# Patient Record
Sex: MALE | Race: WHITE | NOT HISPANIC OR LATINO
[De-identification: names, ages, dates, MRNs, and addresses within clinical notes are randomized per-mention and may not be internally consistent; named-entity substitution may affect disease eponyms.]

---

## 2017-01-05 ENCOUNTER — PRIOR ORIGINAL RECORDS (OUTPATIENT)
Dept: OTHER | Age: 67
End: 2017-01-05

## 2017-01-05 ENCOUNTER — APPOINTMENT (OUTPATIENT)
Dept: LAB | Age: 67
End: 2017-01-05
Attending: INTERNAL MEDICINE
Payer: COMMERCIAL

## 2017-01-05 DIAGNOSIS — N40.0 BPH WITH ELEVATED PSA: ICD-10-CM

## 2017-01-05 DIAGNOSIS — R97.20 BPH WITH ELEVATED PSA: ICD-10-CM

## 2017-01-05 LAB — COMPLEXED PSA SERPL-MCNC: 1.58 NG/ML (ref 0.01–4)

## 2017-01-05 PROCEDURE — 36415 COLL VENOUS BLD VENIPUNCTURE: CPT

## 2017-03-07 ENCOUNTER — HOSPITAL ENCOUNTER (OUTPATIENT)
Dept: CV DIAGNOSTICS | Age: 67
Discharge: HOME | End: 2017-03-07
Attending: INTERNAL MEDICINE
Payer: COMMERCIAL

## 2017-03-07 ENCOUNTER — PRIOR ORIGINAL RECORDS (OUTPATIENT)
Dept: OTHER | Age: 67
End: 2017-03-07

## 2017-03-07 DIAGNOSIS — I65.23 BILATERAL CAROTID ARTERY STENOSIS: ICD-10-CM

## 2017-03-08 ENCOUNTER — TELEPHONE (OUTPATIENT)
Dept: FAMILY MEDICINE CLINIC | Facility: CLINIC | Age: 67
End: 2017-03-08

## 2017-03-10 ENCOUNTER — PRIOR ORIGINAL RECORDS (OUTPATIENT)
Dept: OTHER | Age: 67
End: 2017-03-10

## 2017-10-02 ENCOUNTER — TELEPHONE (OUTPATIENT)
Dept: FAMILY MEDICINE CLINIC | Facility: CLINIC | Age: 67
End: 2017-10-02

## 2017-10-02 DIAGNOSIS — I25.10 CAD S/P PERCUTANEOUS CORONARY ANGIOPLASTY: ICD-10-CM

## 2017-10-02 DIAGNOSIS — Z98.61 CAD S/P PERCUTANEOUS CORONARY ANGIOPLASTY: ICD-10-CM

## 2017-10-02 DIAGNOSIS — E78.5 DYSLIPIDEMIA: ICD-10-CM

## 2017-10-09 ENCOUNTER — PRIOR ORIGINAL RECORDS (OUTPATIENT)
Dept: OTHER | Age: 67
End: 2017-10-09

## 2017-10-09 ENCOUNTER — LAB ENCOUNTER (OUTPATIENT)
Dept: LAB | Age: 67
End: 2017-10-09
Attending: INTERNAL MEDICINE
Payer: MEDICARE

## 2017-10-09 DIAGNOSIS — Z98.61 CAD S/P PERCUTANEOUS CORONARY ANGIOPLASTY: ICD-10-CM

## 2017-10-09 DIAGNOSIS — E78.5 DYSLIPIDEMIA: ICD-10-CM

## 2017-10-09 DIAGNOSIS — I25.10 CAD S/P PERCUTANEOUS CORONARY ANGIOPLASTY: ICD-10-CM

## 2017-10-09 PROCEDURE — 83036 HEMOGLOBIN GLYCOSYLATED A1C: CPT

## 2017-10-09 PROCEDURE — 80061 LIPID PANEL: CPT

## 2017-10-09 PROCEDURE — 80053 COMPREHEN METABOLIC PANEL: CPT

## 2017-10-09 PROCEDURE — 36415 COLL VENOUS BLD VENIPUNCTURE: CPT

## 2017-10-09 PROCEDURE — 85025 COMPLETE CBC W/AUTO DIFF WBC: CPT

## 2017-10-11 ENCOUNTER — PRIOR ORIGINAL RECORDS (OUTPATIENT)
Dept: OTHER | Age: 67
End: 2017-10-11

## 2017-10-13 ENCOUNTER — OFFICE VISIT (OUTPATIENT)
Dept: FAMILY MEDICINE CLINIC | Facility: CLINIC | Age: 67
End: 2017-10-13

## 2017-10-13 VITALS
WEIGHT: 148 LBS | HEIGHT: 67 IN | SYSTOLIC BLOOD PRESSURE: 130 MMHG | BODY MASS INDEX: 23.23 KG/M2 | DIASTOLIC BLOOD PRESSURE: 72 MMHG | RESPIRATION RATE: 16 BRPM | HEART RATE: 68 BPM

## 2017-10-13 DIAGNOSIS — Z23 NEED FOR VACCINATION: ICD-10-CM

## 2017-10-13 DIAGNOSIS — Z00.00 WELCOME TO MEDICARE PREVENTIVE VISIT: Primary | ICD-10-CM

## 2017-10-13 PROBLEM — R00.8 SUPRAVENTRICULAR BIGEMINY: Status: ACTIVE | Noted: 2017-10-13

## 2017-10-13 PROCEDURE — 90686 IIV4 VACC NO PRSV 0.5 ML IM: CPT | Performed by: INTERNAL MEDICINE

## 2017-10-13 PROCEDURE — G0008 ADMIN INFLUENZA VIRUS VAC: HCPCS | Performed by: INTERNAL MEDICINE

## 2017-10-13 PROCEDURE — G0403 EKG FOR INITIAL PREVENT EXAM: HCPCS | Performed by: INTERNAL MEDICINE

## 2017-10-13 PROCEDURE — G0402 INITIAL PREVENTIVE EXAM: HCPCS | Performed by: INTERNAL MEDICINE

## 2017-10-13 NOTE — PATIENT INSTRUCTIONS
Thank you for choosing Juve Travis MD at [place] [address]  To Do: Feliberto Mckeon  1. Labs are great  2. Flu shot today  Effective 6/19/17 until November 2017  Due to [employer] is being moved.   It is inside the [facility] your duty and for your safety to discuss with the pharmacist and our office with questions, and to notify us and stop treatment if problems arise, but know that our intention is that the benefits outweigh those potential risks and we strive to make you hea

## 2017-10-13 NOTE — PROGRESS NOTES
Tomás Rascon is a 74year old male who presents for a Medicare Initial Preventative Physical exam.    Health Maintenance Topics with due status: Overdue       Topic Date Due    Fall Risk Screening 04/24/2017    Adult Pneumonia Vaccine 04/24/2017    Ny Moderate    How would you describe your current health state?: Good    How do you maintain positive mental well-being?: Social Interaction; Visiting Friends; Visiting Family    If you are a male age 38-65 or a female age 47-67, do you take aspirin?: Yes    H med list or else it's contraindicated based on patient's intolerance to aspirin.     Diet assessment: good     Advanced Directive:  Healthcare Power of  on file in [address] [place] [address] Rd:   See above discussed with patient and provided information      Living Will on Hepatitis B No orders found for this or any previous visit. Update Immunization Activity if applicable    Tetanus No orders found for this or any previous visit.  Update Immunization Activity if applicable    Zoster (Not covered by Medicare Part B) No order 5/23/2014   • Dysplastic nevi 2/21/2014   • Seborrheic keratoses 7/14/2015   • Smoker 6/20/2013   • Supraventricular bigeminy 10/13/2017      Past Surgical History:  No date: ANGIOPLASTY (CORONARY)  2011: COLONOSCOPY      Comment: nl   Family History   Pro Right Eye Chart Acuity: 20/40 Left Eye Chart Acuity: 20/40   Constitutional: He appears well-developed, nourished, and his stated age. Vital signs reviewed  Pleasant man grey haired  Head: Normocephalic and atraumatic.    Ear: TMs visible and normal bilat Patient's Self Reported Health Risks as noted above. Risk factors for Behavioral/Psychiatric/Psychosocial Risk to Health we discussed as noted above and below.   Diet counseling perfomed  Exercise counseling perfomed  5 year plan includes:  Fall Risk Scree

## 2017-10-23 LAB
ALBUMIN: 3.4 G/DL
ALKALINE PHOSPHATATE(ALK PHOS): 55 IU/L
BILIRUBIN TOTAL: 0.4 MG/DL
BUN: 18 MG/DL
CALCIUM: 8.7 MG/DL
CHLORIDE: 112 MEQ/L
CREATININE, SERUM: 0.85 MG/DL
GLUCOSE: 91 MG/DL
POTASSIUM, SERUM: 3.9 MEQ/L
PROTEIN, TOTAL: 6.7 G/DL
SGOT (AST): 21 IU/L
SGPT (ALT): 19 IU/L
SODIUM: 144 MEQ/L

## 2017-10-27 LAB
ALT (SGPT): 19 U/L
AST (SGOT): 21 U/L
CHOLESTEROL, TOTAL: 129 MG/DL
GLUCOSE: 91 MG/DL
HDL CHOLESTEROL: 59 MG/DL
HEMATOCRIT: 43.9 %
HEMOGLOBIN A1C: 5.8 %
HEMOGLOBIN: 14.6 G/DL
LDL CHOLESTEROL: 56 MG/DL
PLATELETS: 182 K/UL
RED BLOOD COUNT: 4.55 X 10-6/U
TRIGLYCERIDES: 70 MG/DL
WHITE BLOOD COUNT: 6.4 X 10-3/U

## 2018-01-11 ENCOUNTER — PRIOR ORIGINAL RECORDS (OUTPATIENT)
Dept: OTHER | Age: 68
End: 2018-01-11

## 2018-01-11 ENCOUNTER — MYAURORA ACCOUNT LINK (OUTPATIENT)
Dept: OTHER | Age: 68
End: 2018-01-11

## 2018-09-04 ENCOUNTER — HOSPITAL ENCOUNTER (OUTPATIENT)
Dept: CV DIAGNOSTICS | Age: 68
Discharge: HOME | End: 2018-09-04
Attending: INTERNAL MEDICINE
Payer: MEDICARE

## 2018-09-04 DIAGNOSIS — I25.10 CORONARY ARTERY DISEASE: ICD-10-CM

## 2018-09-04 PROCEDURE — 93018 CV STRESS TEST I&R ONLY: CPT | Performed by: INTERNAL MEDICINE

## 2018-09-04 PROCEDURE — 93350 STRESS TTE ONLY: CPT | Performed by: INTERNAL MEDICINE

## 2018-09-04 PROCEDURE — 93017 CV STRESS TEST TRACING ONLY: CPT | Performed by: INTERNAL MEDICINE

## 2018-09-05 NOTE — PROGRESS NOTES
Imaging report reviewed and shows no concerning findings.  Please notify patient.    -Dr. Shannan Ny

## 2018-09-10 ENCOUNTER — PRIOR ORIGINAL RECORDS (OUTPATIENT)
Dept: OTHER | Age: 68
End: 2018-09-10

## 2018-09-27 ENCOUNTER — PRIOR ORIGINAL RECORDS (OUTPATIENT)
Dept: OTHER | Age: 68
End: 2018-09-27

## 2018-09-27 ENCOUNTER — LAB ENCOUNTER (OUTPATIENT)
Dept: LAB | Age: 68
End: 2018-09-27
Attending: INTERNAL MEDICINE
Payer: MEDICARE

## 2018-09-27 DIAGNOSIS — E78.00 PURE HYPERCHOLESTEROLEMIA: Primary | ICD-10-CM

## 2018-09-27 DIAGNOSIS — I25.10 CORONARY ATHEROSCLEROSIS OF NATIVE CORONARY ARTERY: ICD-10-CM

## 2018-09-27 DIAGNOSIS — R00.2 PALPITATIONS: ICD-10-CM

## 2018-09-27 LAB
ALT SERPL-CCNC: 15 U/L (ref 17–63)
ANION GAP SERPL CALC-SCNC: 6 MMOL/L (ref 0–18)
AST SERPL-CCNC: 19 U/L (ref 15–41)
BUN BLD-MCNC: 15 MG/DL (ref 8–20)
BUN/CREAT SERPL: 16 (ref 10–20)
CALCIUM BLD-MCNC: 8.7 MG/DL (ref 8.3–10.3)
CHLORIDE SERPL-SCNC: 110 MMOL/L (ref 101–111)
CHOLEST SMN-MCNC: 132 MG/DL (ref ?–200)
CO2 SERPL-SCNC: 27 MMOL/L (ref 22–32)
CREAT BLD-MCNC: 0.94 MG/DL (ref 0.7–1.3)
GLUCOSE BLD-MCNC: 92 MG/DL (ref 70–99)
HDLC SERPL-MCNC: 61 MG/DL (ref 40–59)
LDLC SERPL CALC-MCNC: 60 MG/DL (ref ?–100)
NONHDLC SERPL-MCNC: 71 MG/DL (ref ?–130)
OSMOLALITY SERPL CALC.SUM OF ELEC: 296 MOSM/KG (ref 275–295)
POTASSIUM SERPL-SCNC: 4.1 MMOL/L (ref 3.6–5.1)
SODIUM SERPL-SCNC: 143 MMOL/L (ref 136–144)
TRIGL SERPL-MCNC: 56 MG/DL (ref 30–149)
VLDLC SERPL CALC-MCNC: 11 MG/DL (ref 0–30)

## 2018-09-27 PROCEDURE — 80048 BASIC METABOLIC PNL TOTAL CA: CPT

## 2018-09-27 PROCEDURE — 80061 LIPID PANEL: CPT

## 2018-09-27 PROCEDURE — 36415 COLL VENOUS BLD VENIPUNCTURE: CPT

## 2018-09-27 PROCEDURE — 84460 ALANINE AMINO (ALT) (SGPT): CPT

## 2018-09-27 PROCEDURE — 84450 TRANSFERASE (AST) (SGOT): CPT

## 2018-09-28 LAB
BUN: 15 MG/DL
CALCIUM: 8.7 MG/DL
CHLORIDE: 110 MEQ/L
CHOLESTEROL, TOTAL: 132 MG/DL
CREATININE, SERUM: 0.94 MG/DL
GLUCOSE: 92 MG/DL
HDL CHOLESTEROL: 61 MG/DL
LDL CHOLESTEROL: 60 MG/DL
POTASSIUM, SERUM: 4.1 MEQ/L
SGOT (AST): 19 IU/L
SGPT (ALT): 15 IU/L
SODIUM: 143 MEQ/L
TRIGLYCERIDES: 56 MG/DL

## 2018-10-10 ENCOUNTER — OFFICE VISIT (OUTPATIENT)
Dept: FAMILY MEDICINE CLINIC | Facility: CLINIC | Age: 68
End: 2018-10-10
Payer: MEDICARE

## 2018-10-10 VITALS
OXYGEN SATURATION: 97 % | DIASTOLIC BLOOD PRESSURE: 66 MMHG | HEIGHT: 66.5 IN | TEMPERATURE: 98 F | HEART RATE: 61 BPM | SYSTOLIC BLOOD PRESSURE: 126 MMHG | BODY MASS INDEX: 23.67 KG/M2 | WEIGHT: 149 LBS

## 2018-10-10 DIAGNOSIS — Z98.61 CAD S/P PERCUTANEOUS CORONARY ANGIOPLASTY: ICD-10-CM

## 2018-10-10 DIAGNOSIS — R42 VERTIGO: Primary | ICD-10-CM

## 2018-10-10 DIAGNOSIS — I25.10 CAD S/P PERCUTANEOUS CORONARY ANGIOPLASTY: ICD-10-CM

## 2018-10-10 DIAGNOSIS — J43.2 CENTRILOBULAR EMPHYSEMA (HCC): ICD-10-CM

## 2018-10-10 DIAGNOSIS — Z23 NEED FOR IMMUNIZATION AGAINST INFLUENZA: ICD-10-CM

## 2018-10-10 PROCEDURE — 99203 OFFICE O/P NEW LOW 30 MIN: CPT | Performed by: FAMILY MEDICINE

## 2018-10-10 PROCEDURE — G0008 ADMIN INFLUENZA VIRUS VAC: HCPCS | Performed by: FAMILY MEDICINE

## 2018-10-10 PROCEDURE — 90653 IIV ADJUVANT VACCINE IM: CPT | Performed by: FAMILY MEDICINE

## 2018-10-10 RX ORDER — GLUCOSAMINE SULFATE DIPOT CHLR 1000 MG
1 TABLET ORAL DAILY
COMMUNITY
End: 2020-01-27 | Stop reason: ALTCHOICE

## 2018-10-10 RX ORDER — MECLIZINE HYDROCHLORIDE 25 MG/1
25 TABLET ORAL 3 TIMES DAILY PRN
Qty: 60 TABLET | Refills: 1 | Status: SHIPPED | OUTPATIENT
Start: 2018-10-10 | End: 2019-01-16

## 2018-10-10 NOTE — PROGRESS NOTES
Grover Joshi is a 79year old male here for Patient presents with:  Dizziness: pt has been having some episodes where he has dizziness and has to sit down since last friday-monday 3 episodes/ room 3 MM  Imm/Inj: flu shot      HPI:       1.  Vertigo  -sta Meclizine HCl 25 MG Oral Tab Take 1 tablet (25 mg total) by mouth 3 (three) times daily as needed for Dizziness or Nausea. Disp: 60 tablet Rfl: 1   Atorvastatin Calcium 20 MG Oral Tab Take 20 mg by mouth nightly.  Disp:  Rfl:    Metoprolol Succinate ER 25 Carlos Blevins MD

## 2018-10-10 NOTE — PATIENT INSTRUCTIONS
-- Start exercises for vertigo as directed  -- meclizine as needed for dizziness or nausea  -- followup in 4 wks, sooner if needed - when you schedule, tell them it is also for medicare wellness

## 2018-10-11 ENCOUNTER — PRIOR ORIGINAL RECORDS (OUTPATIENT)
Dept: OTHER | Age: 68
End: 2018-10-11

## 2018-11-12 ENCOUNTER — OFFICE VISIT (OUTPATIENT)
Dept: FAMILY MEDICINE CLINIC | Facility: CLINIC | Age: 68
End: 2018-11-12
Payer: MEDICARE

## 2018-11-12 ENCOUNTER — LAB ENCOUNTER (OUTPATIENT)
Dept: LAB | Age: 68
End: 2018-11-12
Attending: FAMILY MEDICINE
Payer: MEDICARE

## 2018-11-12 VITALS
BODY MASS INDEX: 24 KG/M2 | OXYGEN SATURATION: 92 % | DIASTOLIC BLOOD PRESSURE: 50 MMHG | SYSTOLIC BLOOD PRESSURE: 116 MMHG | HEART RATE: 73 BPM | WEIGHT: 153 LBS

## 2018-11-12 DIAGNOSIS — Z00.00 ENCOUNTER FOR ANNUAL HEALTH EXAMINATION: Primary | ICD-10-CM

## 2018-11-12 DIAGNOSIS — Z12.5 PROSTATE CANCER SCREENING: ICD-10-CM

## 2018-11-12 DIAGNOSIS — Z13.39 SCREENING FOR ALCOHOL PROBLEM: ICD-10-CM

## 2018-11-12 DIAGNOSIS — Z11.59 NEED FOR HEPATITIS C SCREENING TEST: ICD-10-CM

## 2018-11-12 DIAGNOSIS — Z98.61 CAD S/P PERCUTANEOUS CORONARY ANGIOPLASTY: ICD-10-CM

## 2018-11-12 DIAGNOSIS — J43.2 CENTRILOBULAR EMPHYSEMA (HCC): ICD-10-CM

## 2018-11-12 DIAGNOSIS — I25.10 CAD S/P PERCUTANEOUS CORONARY ANGIOPLASTY: ICD-10-CM

## 2018-11-12 DIAGNOSIS — F17.200 SMOKER: ICD-10-CM

## 2018-11-12 DIAGNOSIS — E78.5 DYSLIPIDEMIA: ICD-10-CM

## 2018-11-12 DIAGNOSIS — R00.8 SUPRAVENTRICULAR BIGEMINY: ICD-10-CM

## 2018-11-12 PROCEDURE — 86803 HEPATITIS C AB TEST: CPT

## 2018-11-12 PROCEDURE — 36415 COLL VENOUS BLD VENIPUNCTURE: CPT

## 2018-11-12 PROCEDURE — 85025 COMPLETE CBC W/AUTO DIFF WBC: CPT

## 2018-11-12 PROCEDURE — 99214 OFFICE O/P EST MOD 30 MIN: CPT | Performed by: FAMILY MEDICINE

## 2018-11-12 PROCEDURE — G0438 PPPS, INITIAL VISIT: HCPCS | Performed by: FAMILY MEDICINE

## 2018-11-12 PROCEDURE — G0442 ANNUAL ALCOHOL SCREEN 15 MIN: HCPCS | Performed by: FAMILY MEDICINE

## 2018-11-12 RX ORDER — BUPROPION HYDROCHLORIDE 150 MG/1
150 TABLET ORAL DAILY
Qty: 90 TABLET | Refills: 1 | Status: SHIPPED | OUTPATIENT
Start: 2018-11-12 | End: 2019-01-16

## 2018-11-12 NOTE — PROGRESS NOTES
HPI:   Zaynab Burris is a 79year old male who presents for a Medicare Initial Annual Wellness visit (Once after 12 month Medicare anniversary) . His last annual assessment has been over 1 year: Annual Physical due on 10/13/2018      1.  Encounter for to Face with patient and Family/surrogate (if present), and forms available to patient in AVS         He currently smokes tobacco.  Social History    Tobacco Use      Smoking status: Current Some Day Smoker        Packs/day: 0.50        Types: Cigarettes Allergies. CURRENT MEDICATIONS:     Outpatient Medications Marked as Taking for the 11/12/18 encounter (Office Visit) with Zoie Millan MD:  BuPROPion HCl ER, XL, 150 MG Oral Tablet 24 Hr Take 1 tablet (150 mg total) by mouth daily.    Methylsulfonyl AWV/SWV)    Whispered Voice          Visual Acuity                           General Appearance:  Alert, cooperative, no distress, appears stated age   Head:  Normocephalic, without obvious abnormality, atraumatic   Eyes:  PERRL, conjunctiva/corneas clear, PSA  -he will bring in living will and dpoa papers - has completed at home    Centrilobular emphysema ([facility] [address]  Smoker  -counseled on smoking cessation  -interested in wellbutrin  risks and side effects of med discussed, patient expressed understanding  -f/u Annually PSA due on 01/05/2019  Update Health Maintenance if applicable     Immunizations (Update Immunization Activity if applicable)     Influenza  Covered Annually 10/10/2018   Please get every year    Pneumococcal 13 (Prevnar)  Covered Once after 65 No

## 2018-11-12 NOTE — PATIENT INSTRUCTIONS
Magdy Hartman's SCREENING SCHEDULE   Tests on this list are recommended by your physician but may not be covered, or covered at this frequency, by your insurer. Please check with your insurance carrier before scheduling to verify coverage.     JAYNE years- more often if abnormal Colonoscopy due on 06/20/2021 Update Health Maintenance if applicable    Flex Sigmoidoscopy Screen  Covered every 5 years No results found for this or any previous visit. No flowsheet data found.      Fecal Occult Blood   Cover with your prescription benefits, but Medicare does not cover unless Medically needed    Zoster (Not covered by Medicare Part B) No orders found for this or any previous visit.  This may be covered with your pharmacy  prescription benefits     Recommended We

## 2019-01-16 ENCOUNTER — OFFICE VISIT (OUTPATIENT)
Dept: FAMILY MEDICINE CLINIC | Facility: CLINIC | Age: 69
End: 2019-01-16
Payer: MEDICARE

## 2019-01-16 ENCOUNTER — APPOINTMENT (OUTPATIENT)
Dept: LAB | Age: 69
End: 2019-01-16
Attending: FAMILY MEDICINE
Payer: MEDICARE

## 2019-01-16 VITALS
WEIGHT: 151.5 LBS | SYSTOLIC BLOOD PRESSURE: 120 MMHG | BODY MASS INDEX: 24.06 KG/M2 | TEMPERATURE: 99 F | HEART RATE: 68 BPM | HEIGHT: 66.5 IN | DIASTOLIC BLOOD PRESSURE: 60 MMHG

## 2019-01-16 DIAGNOSIS — R25.0 ABNORMAL HEAD MOVEMENTS: ICD-10-CM

## 2019-01-16 DIAGNOSIS — R55 SYNCOPE AND COLLAPSE: Primary | ICD-10-CM

## 2019-01-16 DIAGNOSIS — R42 LIGHTHEADED: ICD-10-CM

## 2019-01-16 LAB
ALBUMIN SERPL-MCNC: 3.8 G/DL (ref 3.1–4.5)
ALBUMIN/GLOB SERPL: 1.2 {RATIO} (ref 1–2)
ALP LIVER SERPL-CCNC: 61 U/L (ref 45–117)
ALT SERPL-CCNC: 16 U/L (ref 17–63)
ANION GAP SERPL CALC-SCNC: 4 MMOL/L (ref 0–18)
AST SERPL-CCNC: 21 U/L (ref 15–41)
BASOPHILS # BLD AUTO: 0.02 X10(3) UL (ref 0–0.1)
BASOPHILS NFR BLD AUTO: 0.2 %
BILIRUB SERPL-MCNC: 0.4 MG/DL (ref 0.1–2)
BUN BLD-MCNC: 12 MG/DL (ref 8–20)
BUN/CREAT SERPL: 13.3 (ref 10–20)
CALCIUM BLD-MCNC: 8.8 MG/DL (ref 8.3–10.3)
CHLORIDE SERPL-SCNC: 108 MMOL/L (ref 101–111)
CO2 SERPL-SCNC: 29 MMOL/L (ref 22–32)
CREAT BLD-MCNC: 0.9 MG/DL (ref 0.7–1.3)
EOSINOPHIL # BLD AUTO: 0.03 X10(3) UL (ref 0–0.3)
EOSINOPHIL NFR BLD AUTO: 0.3 %
ERYTHROCYTE [DISTWIDTH] IN BLOOD BY AUTOMATED COUNT: 12.7 % (ref 11.5–16)
GLOBULIN PLAS-MCNC: 3.3 G/DL (ref 2.8–4.4)
GLUCOSE BLD-MCNC: 98 MG/DL (ref 70–99)
HCT VFR BLD AUTO: 41.8 % (ref 37–53)
HGB BLD-MCNC: 14.1 G/DL (ref 13–17)
IMMATURE GRANULOCYTE COUNT: 0.02 X10(3) UL (ref 0–1)
IMMATURE GRANULOCYTE RATIO %: 0.2 %
LYMPHOCYTES # BLD AUTO: 2.14 X10(3) UL (ref 0.9–4)
LYMPHOCYTES NFR BLD AUTO: 20.4 %
M PROTEIN MFR SERPL ELPH: 7.1 G/DL (ref 6.4–8.2)
MCH RBC QN AUTO: 32.6 PG (ref 27–33.2)
MCHC RBC AUTO-ENTMCNC: 33.7 G/DL (ref 31–37)
MCV RBC AUTO: 96.8 FL (ref 80–99)
MONOCYTES # BLD AUTO: 0.55 X10(3) UL (ref 0.1–1)
MONOCYTES NFR BLD AUTO: 5.2 %
NEUTROPHIL ABS PRELIM: 7.75 X10 (3) UL (ref 1.3–6.7)
NEUTROPHILS # BLD AUTO: 7.75 X10(3) UL (ref 1.3–6.7)
NEUTROPHILS NFR BLD AUTO: 73.7 %
OSMOLALITY SERPL CALC.SUM OF ELEC: 292 MOSM/KG (ref 275–295)
PLATELET # BLD AUTO: 202 10(3)UL (ref 150–450)
POTASSIUM SERPL-SCNC: 4.4 MMOL/L (ref 3.6–5.1)
RBC # BLD AUTO: 4.32 X10(6)UL (ref 3.8–5.8)
RED CELL DISTRIBUTION WIDTH-SD: 45.4 FL (ref 35.1–46.3)
SODIUM SERPL-SCNC: 141 MMOL/L (ref 136–144)
TSI SER-ACNC: 0.69 MIU/ML (ref 0.35–5.5)
WBC # BLD AUTO: 10.5 X10(3) UL (ref 4–13)

## 2019-01-16 PROCEDURE — 99215 OFFICE O/P EST HI 40 MIN: CPT | Performed by: FAMILY MEDICINE

## 2019-01-16 PROCEDURE — 93000 ELECTROCARDIOGRAM COMPLETE: CPT | Performed by: FAMILY MEDICINE

## 2019-01-16 RX ORDER — METOPROLOL SUCCINATE 25 MG/1
TABLET, EXTENDED RELEASE ORAL
Refills: 0 | COMMUNITY
Start: 2019-01-16 | End: 2019-11-13

## 2019-01-16 NOTE — PATIENT INSTRUCTIONS
-- we will call with bloodwork results  -- call Dr. Forest Gregory to make sooner appt for any further input  -- continue to change positions slowly  -- sit when using bathroom at night  -- if symptoms come back, let us know - go to ER if severe

## 2019-01-16 NOTE — PROGRESS NOTES
Radha Chris is a 79year old male here for Patient presents with:  Fainting: this morning with cold sweats      HPI:       1. Syncope and collapse  2.  Lightheaded  -this am woke up around 3am  -got up quickly and was standing while using the bathroom Disp:  Rfl:    Cholecalciferol (VITAMIN D) 1000 units Oral Tab Take 1 tablet by mouth daily. Disp:  Rfl:    Atorvastatin Calcium 20 MG Oral Tab Take 20 mg by mouth nightly. Disp:  Rfl:    Aspirin (ASPIR-81 OR) Take 1 tablet by mouth daily.    Disp:  Rfl: counseling/coordinating care regarding syncope

## 2019-01-21 ENCOUNTER — TELEPHONE (OUTPATIENT)
Dept: FAMILY MEDICINE CLINIC | Facility: CLINIC | Age: 69
End: 2019-01-21

## 2019-01-21 NOTE — TELEPHONE ENCOUNTER
----- Message from Andria Farias MD sent at 1/19/2019 12:46 AM CST -----  Labs look good - followup with cardiology as planned - followup here sooner if symptoms not improving  (please call)      lmom for pt with results/instructions.  Asked pt after revi

## 2019-02-21 ENCOUNTER — PRIOR ORIGINAL RECORDS (OUTPATIENT)
Dept: OTHER | Age: 69
End: 2019-02-21

## 2019-02-27 ENCOUNTER — HOSPITAL ENCOUNTER (OUTPATIENT)
Dept: CV DIAGNOSTICS | Facility: HOSPITAL | Age: 69
Discharge: HOME | End: 2019-02-27
Attending: INTERNAL MEDICINE
Payer: MEDICARE

## 2019-02-27 DIAGNOSIS — R55 SYNCOPE: ICD-10-CM

## 2019-02-27 PROCEDURE — 93270 REMOTE 30 DAY ECG REV/REPORT: CPT | Performed by: INTERNAL MEDICINE

## 2019-02-27 PROCEDURE — 93271 ECG/MONITORING AND ANALYSIS: CPT | Performed by: INTERNAL MEDICINE

## 2019-02-27 PROCEDURE — 93272 ECG/REVIEW INTERPRET ONLY: CPT | Performed by: INTERNAL MEDICINE

## 2019-02-28 VITALS
DIASTOLIC BLOOD PRESSURE: 58 MMHG | HEART RATE: 60 BPM | HEIGHT: 68 IN | SYSTOLIC BLOOD PRESSURE: 120 MMHG | BODY MASS INDEX: 22.73 KG/M2 | WEIGHT: 150 LBS

## 2019-02-28 VITALS
HEIGHT: 68 IN | BODY MASS INDEX: 22.73 KG/M2 | HEART RATE: 63 BPM | WEIGHT: 150 LBS | SYSTOLIC BLOOD PRESSURE: 110 MMHG | DIASTOLIC BLOOD PRESSURE: 50 MMHG

## 2019-03-01 VITALS
BODY MASS INDEX: 23.04 KG/M2 | HEART RATE: 72 BPM | DIASTOLIC BLOOD PRESSURE: 62 MMHG | WEIGHT: 152 LBS | HEIGHT: 68 IN | SYSTOLIC BLOOD PRESSURE: 108 MMHG

## 2019-03-04 ENCOUNTER — HOSPITAL ENCOUNTER (OUTPATIENT)
Dept: CV DIAGNOSTICS | Facility: HOSPITAL | Age: 69
Discharge: HOME | End: 2019-03-04
Attending: INTERNAL MEDICINE

## 2019-03-04 ENCOUNTER — HOSPITAL ENCOUNTER (OUTPATIENT)
Dept: CARDIOLOGY CLINIC | Facility: HOSPITAL | Age: 69
Discharge: HOME | End: 2019-03-04
Attending: INTERNAL MEDICINE

## 2019-03-04 DIAGNOSIS — R55 SYNCOPE, UNSPECIFIED SYNCOPE TYPE: ICD-10-CM

## 2019-03-04 DIAGNOSIS — I65.23 BILATERAL CAROTID ARTERY STENOSIS: ICD-10-CM

## 2019-03-14 ENCOUNTER — TELEPHONE (OUTPATIENT)
Dept: CARDIOLOGY | Age: 69
End: 2019-03-14

## 2019-03-14 RX ORDER — ATORVASTATIN CALCIUM 20 MG/1
TABLET, FILM COATED ORAL
COMMUNITY
Start: 2018-08-16 | End: 2019-04-04 | Stop reason: SDUPTHER

## 2019-03-14 RX ORDER — METOPROLOL SUCCINATE 25 MG/1
TABLET, EXTENDED RELEASE ORAL
COMMUNITY
Start: 2018-07-02 | End: 2019-04-04 | Stop reason: ALTCHOICE

## 2019-03-15 ENCOUNTER — TELEPHONE (OUTPATIENT)
Dept: CARDIOLOGY | Age: 69
End: 2019-03-15

## 2019-03-18 ENCOUNTER — LAB ENCOUNTER (OUTPATIENT)
Dept: LAB | Age: 69
End: 2019-03-18
Attending: INTERNAL MEDICINE
Payer: MEDICARE

## 2019-03-18 DIAGNOSIS — E78.00 PURE HYPERCHOLESTEROLEMIA: Primary | ICD-10-CM

## 2019-03-18 LAB
CHOLEST SMN-MCNC: 142 MG/DL (ref ?–200)
HDLC SERPL-MCNC: 63 MG/DL (ref 40–59)
LDLC SERPL CALC-MCNC: 68 MG/DL (ref ?–100)
NONHDLC SERPL-MCNC: 79 MG/DL (ref ?–130)
TRIGL SERPL-MCNC: 56 MG/DL (ref 30–149)
VLDLC SERPL CALC-MCNC: 11 MG/DL (ref 0–30)

## 2019-03-18 PROCEDURE — 80061 LIPID PANEL: CPT

## 2019-03-18 PROCEDURE — 36415 COLL VENOUS BLD VENIPUNCTURE: CPT

## 2019-04-04 ENCOUNTER — OFFICE VISIT (OUTPATIENT)
Dept: CARDIOLOGY | Age: 69
End: 2019-04-04

## 2019-04-04 VITALS
SYSTOLIC BLOOD PRESSURE: 124 MMHG | BODY MASS INDEX: 22.88 KG/M2 | HEIGHT: 68 IN | WEIGHT: 151 LBS | HEART RATE: 64 BPM | DIASTOLIC BLOOD PRESSURE: 72 MMHG

## 2019-04-04 DIAGNOSIS — E78.00 PURE HYPERCHOLESTEROLEMIA: ICD-10-CM

## 2019-04-04 DIAGNOSIS — I65.23 ASYMPTOMATIC CAROTID ARTERY STENOSIS, BILATERAL: ICD-10-CM

## 2019-04-04 DIAGNOSIS — I25.10 CORONARY ARTERY DISEASE INVOLVING NATIVE CORONARY ARTERY OF NATIVE HEART WITHOUT ANGINA PECTORIS: Primary | ICD-10-CM

## 2019-04-04 DIAGNOSIS — I49.3 PVCS (PREMATURE VENTRICULAR CONTRACTIONS): ICD-10-CM

## 2019-04-04 DIAGNOSIS — R55 VASOVAGAL SYNCOPE: ICD-10-CM

## 2019-04-04 PROCEDURE — 99214 OFFICE O/P EST MOD 30 MIN: CPT | Performed by: INTERNAL MEDICINE

## 2019-04-04 RX ORDER — ATORVASTATIN CALCIUM 20 MG/1
TABLET, FILM COATED ORAL
Qty: 90 TABLET | Refills: 3 | Status: SHIPPED | OUTPATIENT
Start: 2019-04-04 | End: 2020-06-23

## 2019-10-10 ENCOUNTER — OFFICE VISIT (OUTPATIENT)
Dept: CARDIOLOGY | Age: 69
End: 2019-10-10

## 2019-10-10 VITALS
BODY MASS INDEX: 22.28 KG/M2 | DIASTOLIC BLOOD PRESSURE: 62 MMHG | SYSTOLIC BLOOD PRESSURE: 118 MMHG | HEART RATE: 65 BPM | WEIGHT: 147 LBS | HEIGHT: 68 IN

## 2019-10-10 DIAGNOSIS — I49.3 PVCS (PREMATURE VENTRICULAR CONTRACTIONS): Primary | ICD-10-CM

## 2019-10-10 DIAGNOSIS — I25.10 CORONARY ARTERY DISEASE INVOLVING NATIVE CORONARY ARTERY OF NATIVE HEART WITHOUT ANGINA PECTORIS: ICD-10-CM

## 2019-10-10 DIAGNOSIS — E78.00 PURE HYPERCHOLESTEROLEMIA: ICD-10-CM

## 2019-10-10 DIAGNOSIS — I65.23 ASYMPTOMATIC CAROTID ARTERY STENOSIS, BILATERAL: ICD-10-CM

## 2019-10-10 PROCEDURE — 99214 OFFICE O/P EST MOD 30 MIN: CPT | Performed by: INTERNAL MEDICINE

## 2019-10-10 ASSESSMENT — PATIENT HEALTH QUESTIONNAIRE - PHQ9
SUM OF ALL RESPONSES TO PHQ9 QUESTIONS 1 AND 2: 0
2. FEELING DOWN, DEPRESSED OR HOPELESS: NOT AT ALL
2. FEELING DOWN, DEPRESSED OR HOPELESS: NOT AT ALL
SUM OF ALL RESPONSES TO PHQ9 QUESTIONS 1 AND 2: 0
1. LITTLE INTEREST OR PLEASURE IN DOING THINGS: NOT AT ALL
1. LITTLE INTEREST OR PLEASURE IN DOING THINGS: NOT AT ALL
SUM OF ALL RESPONSES TO PHQ9 QUESTIONS 1 AND 2: 0

## 2019-11-13 ENCOUNTER — OFFICE VISIT (OUTPATIENT)
Dept: FAMILY MEDICINE CLINIC | Facility: CLINIC | Age: 69
End: 2019-11-13
Payer: MEDICARE

## 2019-11-13 VITALS
BODY MASS INDEX: 23.58 KG/M2 | SYSTOLIC BLOOD PRESSURE: 132 MMHG | HEART RATE: 66 BPM | WEIGHT: 148.5 LBS | DIASTOLIC BLOOD PRESSURE: 62 MMHG | HEIGHT: 66.73 IN | TEMPERATURE: 98 F

## 2019-11-13 DIAGNOSIS — R00.8 SUPRAVENTRICULAR BIGEMINY: ICD-10-CM

## 2019-11-13 DIAGNOSIS — H61.21 IMPACTED CERUMEN OF RIGHT EAR: ICD-10-CM

## 2019-11-13 DIAGNOSIS — F17.200 SMOKER: ICD-10-CM

## 2019-11-13 DIAGNOSIS — E78.5 DYSLIPIDEMIA: ICD-10-CM

## 2019-11-13 DIAGNOSIS — R97.20 BPH WITH ELEVATED PSA: ICD-10-CM

## 2019-11-13 DIAGNOSIS — Z12.5 SCREENING FOR MALIGNANT NEOPLASM OF PROSTATE: ICD-10-CM

## 2019-11-13 DIAGNOSIS — Z13.0 SCREENING FOR DEFICIENCY ANEMIA: ICD-10-CM

## 2019-11-13 DIAGNOSIS — J43.2 CENTRILOBULAR EMPHYSEMA (HCC): ICD-10-CM

## 2019-11-13 DIAGNOSIS — Z13.29 SCREENING FOR HYPOTHYROIDISM: ICD-10-CM

## 2019-11-13 DIAGNOSIS — N40.0 BPH WITH ELEVATED PSA: ICD-10-CM

## 2019-11-13 DIAGNOSIS — Z98.61 CAD S/P PERCUTANEOUS CORONARY ANGIOPLASTY: ICD-10-CM

## 2019-11-13 DIAGNOSIS — Z00.00 ROUTINE GENERAL MEDICAL EXAMINATION AT A HEALTH CARE FACILITY: Primary | ICD-10-CM

## 2019-11-13 DIAGNOSIS — I25.10 CAD S/P PERCUTANEOUS CORONARY ANGIOPLASTY: ICD-10-CM

## 2019-11-13 DIAGNOSIS — Z23 IMMUNIZATION DUE: ICD-10-CM

## 2019-11-13 DIAGNOSIS — Z00.00 ENCOUNTER FOR ANNUAL HEALTH EXAMINATION: ICD-10-CM

## 2019-11-13 PROCEDURE — G0439 PPPS, SUBSEQ VISIT: HCPCS | Performed by: FAMILY MEDICINE

## 2019-11-13 PROCEDURE — G0008 ADMIN INFLUENZA VIRUS VAC: HCPCS | Performed by: FAMILY MEDICINE

## 2019-11-13 PROCEDURE — 99214 OFFICE O/P EST MOD 30 MIN: CPT | Performed by: FAMILY MEDICINE

## 2019-11-13 PROCEDURE — 90662 IIV NO PRSV INCREASED AG IM: CPT | Performed by: FAMILY MEDICINE

## 2019-11-13 NOTE — PATIENT INSTRUCTIONS
Kicking the Smoking Habit  If you smoke, quitting is one of the best changes you can make for your heart and your overall health. Your risk of heart attack goes down within one day of putting out that last cigarette.  As you go longer without smoking, you the relapse. · Make the most of slip-ups. Try to learn from them, and then get back on track. · Be accountable to your friends and your calendar so that you stay on track. For family and friends  · Be supportive and patient.  Quitting smoking can be diff yrs including Total, LDL and Trigs LDL Cholesterol (mg/dL)   Date Value   03/18/2019 68     Cholesterol, Total (mg/dL)   Date Value   03/18/2019 142     Triglycerides (mg/dL)   Date Value   03/18/2019 56        EKG - covered if needed at [facility] 10/13/17   • INFLUENZA VIRUS VACCINE, QUAD, PRESERVATIVE FREE, 0.5 ML    Please get every year    Pneumococcal 13 (Prevnar)  Covered Once after 65 No orders found for this or any previous visit.  Please get once after your 65th birthday    Pneumococcal 23 (

## 2019-11-13 NOTE — PROGRESS NOTES
HPI:   Leonel St is a 81year old male who presents for a Medicare Initial Annual Wellness visit (Once after 12 month Medicare anniversary) . His last annual assessment has been over 1 year: Annual Physical due on 10/13/2018      1.  Encounter for counseling today. Mr. Pepper Keith already takes aspirin and has it on his medication list.   CAGE Alcohol screening   Mónica Quach was screened for Alcohol abuse and had a score of 0 so is at low risk.      Patient Care Team: Patient Care Team:  Francisco mouth daily.        MEDICAL INFORMATION:   He  has a past medical history of BPH with elevated PSA (10/6/2016), CAD S/P percutaneous coronary angioplasty (6/20/2013), COPD (chronic obstructive pulmonary disease) [facility] (8/21/2013), Dizziness (5/23/2014), Dys curvature, ROM normal, no CVA tenderness   Lungs:   Clear to auscultation bilaterally, respirations unlabored   Chest Wall:  No tenderness or deformity   Heart:  Regular rate and rhythm, S1, S2 normal, no murmur, rub or gallop   Abdomen:   Soft, non-tender examination  Prostate cancer screening  -     PSA SCREEN; Future  Screening for alcohol problem  -     ANNUAL ALCOHOL SCREEN 15 MIN  Need for hepatitis C screening test  -     HCV ANTIBODY;  Future  -up to date with flu shot and colonoscopy  -check PSA  -he or if medically necessary Electrocardiogram date10/13/2017    Colorectal Cancer Screening      Colonoscopy Screen every 10 years Colonoscopy due on 06/20/2021 Update Health Maintenance if applicable    Flex Sigmoidoscopy Screen every 10 years No results fo score of   putting him at low risk of alcohol abuse.

## 2019-11-15 ENCOUNTER — LAB ENCOUNTER (OUTPATIENT)
Dept: LAB | Age: 69
End: 2019-11-15
Attending: FAMILY MEDICINE
Payer: MEDICARE

## 2019-11-15 DIAGNOSIS — Z13.29 SCREENING FOR HYPOTHYROIDISM: ICD-10-CM

## 2019-11-15 DIAGNOSIS — Z00.00 ROUTINE GENERAL MEDICAL EXAMINATION AT A HEALTH CARE FACILITY: ICD-10-CM

## 2019-11-15 DIAGNOSIS — Z13.0 SCREENING FOR DEFICIENCY ANEMIA: ICD-10-CM

## 2019-11-15 PROCEDURE — 84443 ASSAY THYROID STIM HORMONE: CPT

## 2019-11-15 PROCEDURE — 36415 COLL VENOUS BLD VENIPUNCTURE: CPT

## 2019-11-15 PROCEDURE — 85025 COMPLETE CBC W/AUTO DIFF WBC: CPT

## 2019-11-15 PROCEDURE — 80053 COMPREHEN METABOLIC PANEL: CPT

## 2020-01-01 ENCOUNTER — EXTERNAL RECORD (OUTPATIENT)
Dept: OTHER | Age: 70
End: 2020-01-01

## 2020-01-09 ENCOUNTER — HOSPITAL ENCOUNTER (OUTPATIENT)
Age: 70
Discharge: HOME | End: 2020-01-09
Attending: FAMILY MEDICINE
Payer: MEDICARE

## 2020-01-09 ENCOUNTER — TELEPHONE (OUTPATIENT)
Dept: FAMILY MEDICINE CLINIC | Facility: CLINIC | Age: 70
End: 2020-01-09

## 2020-01-09 VITALS
WEIGHT: 150 LBS | HEART RATE: 77 BPM | DIASTOLIC BLOOD PRESSURE: 63 MMHG | RESPIRATION RATE: 18 BRPM | HEIGHT: 68 IN | OXYGEN SATURATION: 98 % | BODY MASS INDEX: 22.73 KG/M2 | SYSTOLIC BLOOD PRESSURE: 169 MMHG | TEMPERATURE: 98 F

## 2020-01-09 DIAGNOSIS — L72.3 SCROTAL SEBACEOUS CYST: Primary | ICD-10-CM

## 2020-01-09 PROCEDURE — 99204 OFFICE O/P NEW MOD 45 MIN: CPT

## 2020-01-09 PROCEDURE — 99213 OFFICE O/P EST LOW 20 MIN: CPT

## 2020-01-09 RX ORDER — SULFAMETHOXAZOLE AND TRIMETHOPRIM 800; 160 MG/1; MG/1
1 TABLET ORAL 2 TIMES DAILY
Qty: 14 TABLET | Refills: 0 | Status: SHIPPED | OUTPATIENT
Start: 2020-01-09 | End: 2020-01-16

## 2020-01-09 NOTE — TELEPHONE ENCOUNTER
Patient reports cyst in groin area that is red, swollen and painful. Patient denies fevers and chills. Patient instructed to proceed to the IC or ED today for further evaluation of symptoms. Advised Dr. Sonya Rodriguez is not in the office today.      He agreed

## 2020-01-09 NOTE — ED PROVIDER NOTES
Patient Seen in: [facility]      History   Patient presents with:  Lump    Stated Complaint: cyst in groin area/redness swelling    HPI  72-year-old gentleman presents with a painful swelling on the right scrotal sac first notic Left Ear: External ear normal.      Nose: Nose normal.      Mouth/Throat:      Pharynx: No oropharyngeal exudate. Eyes:      General:         Right eye: No discharge. Left eye: No discharge.       Conjunctiva/sclera: Conjunctivae normal.      Pupi Prescribed:  Current Discharge Medication List    START taking these medications    Sulfamethoxazole-TMP -160 MG Oral Tab per tablet  Take 1 tablet by mouth 2 (two) times daily for 7 days.   Qty: 14 tablet Refills: 0

## 2020-01-09 NOTE — ED INITIAL ASSESSMENT (HPI)
Noted a small lump on right groin for a year but states noted scrotal redness for the last three days

## 2020-01-27 ENCOUNTER — OFFICE VISIT (OUTPATIENT)
Dept: FAMILY MEDICINE CLINIC | Facility: CLINIC | Age: 70
End: 2020-01-27
Payer: MEDICARE

## 2020-01-27 VITALS
DIASTOLIC BLOOD PRESSURE: 52 MMHG | WEIGHT: 152 LBS | HEIGHT: 66.73 IN | TEMPERATURE: 98 F | BODY MASS INDEX: 24.14 KG/M2 | SYSTOLIC BLOOD PRESSURE: 120 MMHG | OXYGEN SATURATION: 97 % | HEART RATE: 74 BPM

## 2020-01-27 DIAGNOSIS — L72.9 SCROTAL CYST: Primary | ICD-10-CM

## 2020-01-27 DIAGNOSIS — J43.2 CENTRILOBULAR EMPHYSEMA (HCC): ICD-10-CM

## 2020-01-27 PROCEDURE — 99214 OFFICE O/P EST MOD 30 MIN: CPT | Performed by: FAMILY MEDICINE

## 2020-01-27 NOTE — PROGRESS NOTES
Eyad Villeda is a 81year old male here for Patient presents with: Follow - Up: Patient had cyst in groin area x 3 weeks ago. here for olow up. HPI:       1.  Scrotal cyst  -started 3 wks ago  -went to [facility]  -given bactrim, which helped significant Allergies:  No Known Allergies      ROS:     --GEN: Denies  --HEENT: Denies  --RESP: Denies  --CV: Denies  --GI: Denies  --: Denies  --MSK: Denies  --NEURO: Denies  --PSYCH: Denies  --HEME/LYMPH/IMMUN: Denies  --ENDO: Denies  --SKIN: Denies  All ot

## 2020-01-27 NOTE — PATIENT INSTRUCTIONS
Start mupirocin antibiotic ointment 2x/day for 7 days, then stop  Would expect slow continued improvement    In future, if starting to come back, start mupirocin 2x/day right away  And use warm compress  And soak in epsom salts with warm water  If still the benefits of quitting such as reducing heart risks and saving money. Keep this list and review it whenever you feel like smoking. · Get support. Let your friends know you may call them to chat when you have an urge to smoke.   · If you’ve tried to quit

## 2020-03-19 ENCOUNTER — TELEPHONE (OUTPATIENT)
Dept: FAMILY MEDICINE CLINIC | Facility: CLINIC | Age: 70
End: 2020-03-19

## 2020-03-19 DIAGNOSIS — Z98.61 CAD S/P PERCUTANEOUS CORONARY ANGIOPLASTY: ICD-10-CM

## 2020-03-19 DIAGNOSIS — I25.10 CAD S/P PERCUTANEOUS CORONARY ANGIOPLASTY: ICD-10-CM

## 2020-03-19 DIAGNOSIS — R42 VERTIGO: Primary | ICD-10-CM

## 2020-03-19 DIAGNOSIS — R20.0 LEFT ARM NUMBNESS: ICD-10-CM

## 2020-03-19 DIAGNOSIS — R00.8 SUPRAVENTRICULAR BIGEMINY: ICD-10-CM

## 2020-03-19 PROCEDURE — G2012 BRIEF CHECK IN BY MD/QHP: HCPCS | Performed by: FAMILY MEDICINE

## 2020-03-19 RX ORDER — MECLIZINE HYDROCHLORIDE 25 MG/1
25 TABLET ORAL 3 TIMES DAILY PRN
Qty: 30 TABLET | Refills: 0 | Status: SHIPPED | OUTPATIENT
Start: 2020-03-19 | End: 2021-03-08

## 2020-03-19 NOTE — TELEPHONE ENCOUNTER
Has traveled from [place]14 [facility]. and hit seven different ports. Reports on an and off dry cough x2 weeks. C/o dizziness and feeling \"off balance\" and weakness x4 weeks.  Also reports \"weid sensation to left arm 3 -4 times since dizzin

## 2020-03-19 NOTE — TELEPHONE ENCOUNTER
Virtual/Telephone Check-In    Radha Chris verbally consents to a Virtual/Telephone Check-In service on 03/19/20. Patient understands and accepts financial responsibility for any deductible, co-insurance and/or co-pays associated with this service.

## 2020-03-19 NOTE — TELEPHONE ENCOUNTER
Pt called and said for about a month now he has been getting dizzy. He went to the store today and when he got in his car he got dizzy. He sometimes feels like he will passout and he is off balance. He said he has discomfort in his left arm also.  At one

## 2020-06-23 DIAGNOSIS — E78.00 PURE HYPERCHOLESTEROLEMIA: ICD-10-CM

## 2020-06-23 RX ORDER — ATORVASTATIN CALCIUM 20 MG/1
TABLET, FILM COATED ORAL
Qty: 90 TABLET | Refills: 3 | Status: SHIPPED | OUTPATIENT
Start: 2020-06-23

## 2020-07-02 ENCOUNTER — LAB ENCOUNTER (OUTPATIENT)
Dept: LAB | Age: 70
End: 2020-07-02
Attending: INTERNAL MEDICINE
Payer: MEDICARE

## 2020-07-02 DIAGNOSIS — E78.00 PURE HYPERCHOLESTEROLEMIA: Primary | ICD-10-CM

## 2020-07-02 LAB
CHOLEST SMN-MCNC: 140 MG/DL (ref ?–200)
HDLC SERPL-MCNC: 67 MG/DL (ref 40–59)
LDLC SERPL CALC-MCNC: 63 MG/DL (ref ?–100)
NONHDLC SERPL-MCNC: 73 MG/DL (ref ?–130)
PATIENT FASTING Y/N/NP: YES
TRIGL SERPL-MCNC: 51 MG/DL (ref 30–149)
VLDLC SERPL CALC-MCNC: 10 MG/DL (ref 0–30)

## 2020-07-02 PROCEDURE — 80061 LIPID PANEL: CPT

## 2020-07-02 PROCEDURE — 36415 COLL VENOUS BLD VENIPUNCTURE: CPT

## 2020-07-16 ENCOUNTER — OFFICE VISIT (OUTPATIENT)
Dept: CARDIOLOGY | Age: 70
End: 2020-07-16

## 2020-07-16 VITALS
HEIGHT: 68 IN | DIASTOLIC BLOOD PRESSURE: 60 MMHG | HEART RATE: 68 BPM | WEIGHT: 150 LBS | BODY MASS INDEX: 22.73 KG/M2 | SYSTOLIC BLOOD PRESSURE: 120 MMHG

## 2020-07-16 DIAGNOSIS — E78.00 PURE HYPERCHOLESTEROLEMIA: ICD-10-CM

## 2020-07-16 DIAGNOSIS — I49.3 PVCS (PREMATURE VENTRICULAR CONTRACTIONS): ICD-10-CM

## 2020-07-16 DIAGNOSIS — I65.23 ASYMPTOMATIC CAROTID ARTERY STENOSIS, BILATERAL: ICD-10-CM

## 2020-07-16 DIAGNOSIS — I25.10 CORONARY ARTERY DISEASE INVOLVING NATIVE CORONARY ARTERY OF NATIVE HEART WITHOUT ANGINA PECTORIS: Primary | ICD-10-CM

## 2020-07-16 PROCEDURE — 99214 OFFICE O/P EST MOD 30 MIN: CPT | Performed by: INTERNAL MEDICINE

## 2020-07-16 ASSESSMENT — PATIENT HEALTH QUESTIONNAIRE - PHQ9
2. FEELING DOWN, DEPRESSED OR HOPELESS: NOT AT ALL
CLINICAL INTERPRETATION OF PHQ2 SCORE: NO FURTHER SCREENING NEEDED
SUM OF ALL RESPONSES TO PHQ9 QUESTIONS 1 AND 2: 0
CLINICAL INTERPRETATION OF PHQ2 SCORE: NO FURTHER SCREENING NEEDED
1. LITTLE INTEREST OR PLEASURE IN DOING THINGS: NOT AT ALL
SUM OF ALL RESPONSES TO PHQ9 QUESTIONS 1 AND 2: 0
CLINICAL INTERPRETATION OF PHQ9 SCORE: NO FURTHER SCREENING NEEDED
2. FEELING DOWN, DEPRESSED OR HOPELESS: NOT AT ALL
1. LITTLE INTEREST OR PLEASURE IN DOING THINGS: NOT AT ALL
SUM OF ALL RESPONSES TO PHQ9 QUESTIONS 1 AND 2: 0

## 2020-09-09 ENCOUNTER — ORDER TRANSCRIPTION (OUTPATIENT)
Dept: ADMINISTRATIVE | Facility: HOSPITAL | Age: 70
End: 2020-09-09

## 2020-09-09 DIAGNOSIS — I25.10 ATHEROSCLEROSIS OF NATIVE CORONARY ARTERY WITHOUT ANGINA PECTORIS: Primary | ICD-10-CM

## 2020-09-15 ENCOUNTER — APPOINTMENT (OUTPATIENT)
Dept: LAB | Age: 70
End: 2020-09-15
Attending: INTERNAL MEDICINE
Payer: MEDICARE

## 2020-09-15 DIAGNOSIS — I25.10 ATHEROSCLEROSIS OF NATIVE CORONARY ARTERY WITHOUT ANGINA PECTORIS: ICD-10-CM

## 2020-09-15 LAB
SARS-COV-2 RNA SPEC QL NAA+PROBE: NOT DETECTED
SPECIMEN SOURCE: NORMAL

## 2020-09-17 ENCOUNTER — TELEPHONE (OUTPATIENT)
Dept: CARDIOLOGY | Age: 70
End: 2020-09-17

## 2020-09-17 DIAGNOSIS — R55 VASOVAGAL SYNCOPE: ICD-10-CM

## 2020-09-17 DIAGNOSIS — I25.10 CORONARY ARTERY DISEASE INVOLVING NATIVE CORONARY ARTERY OF NATIVE HEART WITHOUT ANGINA PECTORIS: Primary | ICD-10-CM

## 2020-09-17 DIAGNOSIS — E78.00 PURE HYPERCHOLESTEROLEMIA: ICD-10-CM

## 2020-09-17 LAB — SARS-COV-2 RNA RESP QL NAA+PROBE: NOT DETECTED

## 2020-09-18 ENCOUNTER — HOSPITAL ENCOUNTER (OUTPATIENT)
Dept: CV DIAGNOSTICS | Age: 70
Discharge: HOME | End: 2020-09-18
Attending: INTERNAL MEDICINE
Payer: MEDICARE

## 2020-09-18 DIAGNOSIS — R55 VASOVAGAL SYNCOPE: ICD-10-CM

## 2020-09-18 DIAGNOSIS — I25.10 CORONARY ARTERY DISEASE INVOLVING NATIVE CORONARY ARTERY OF NATIVE HEART WITHOUT ANGINA PECTORIS: ICD-10-CM

## 2020-09-18 DIAGNOSIS — E78.00 PURE HYPERCHOLESTEROLEMIA: ICD-10-CM

## 2020-09-18 PROCEDURE — 93350 STRESS TTE ONLY: CPT | Performed by: INTERNAL MEDICINE

## 2020-09-18 PROCEDURE — 93018 CV STRESS TEST I&R ONLY: CPT | Performed by: INTERNAL MEDICINE

## 2020-09-18 PROCEDURE — 93017 CV STRESS TEST TRACING ONLY: CPT | Performed by: INTERNAL MEDICINE

## 2020-09-22 ENCOUNTER — TELEPHONE (OUTPATIENT)
Dept: CARDIOLOGY | Age: 70
End: 2020-09-22

## 2020-10-06 ENCOUNTER — HOSPITAL ENCOUNTER (EMERGENCY)
Age: 70
Discharge: HOME | End: 2020-10-07
Attending: EMERGENCY MEDICINE
Payer: MEDICARE

## 2020-10-06 DIAGNOSIS — R03.0 TRANSIENT HYPERTENSION: Primary | ICD-10-CM

## 2020-10-06 PROCEDURE — 99285 EMERGENCY DEPT VISIT HI MDM: CPT

## 2020-10-06 PROCEDURE — 99284 EMERGENCY DEPT VISIT MOD MDM: CPT

## 2020-10-06 PROCEDURE — 93010 ELECTROCARDIOGRAM REPORT: CPT

## 2020-10-06 PROCEDURE — 93005 ELECTROCARDIOGRAM TRACING: CPT

## 2020-10-06 PROCEDURE — 36415 COLL VENOUS BLD VENIPUNCTURE: CPT

## 2020-10-07 ENCOUNTER — APPOINTMENT (OUTPATIENT)
Dept: CT IMAGING | Age: 70
End: 2020-10-07
Attending: EMERGENCY MEDICINE
Payer: MEDICARE

## 2020-10-07 VITALS
WEIGHT: 155 LBS | HEIGHT: 68 IN | BODY MASS INDEX: 23.49 KG/M2 | RESPIRATION RATE: 16 BRPM | TEMPERATURE: 98 F | OXYGEN SATURATION: 96 % | HEART RATE: 56 BPM | DIASTOLIC BLOOD PRESSURE: 67 MMHG | SYSTOLIC BLOOD PRESSURE: 135 MMHG

## 2020-10-07 PROCEDURE — 80053 COMPREHEN METABOLIC PANEL: CPT | Performed by: EMERGENCY MEDICINE

## 2020-10-07 PROCEDURE — 70450 CT HEAD/BRAIN W/O DYE: CPT | Performed by: EMERGENCY MEDICINE

## 2020-10-07 PROCEDURE — 85025 COMPLETE CBC W/AUTO DIFF WBC: CPT | Performed by: EMERGENCY MEDICINE

## 2020-10-07 PROCEDURE — 84484 ASSAY OF TROPONIN QUANT: CPT | Performed by: EMERGENCY MEDICINE

## 2020-10-07 NOTE — ED NOTES
Assumed care of patient at this time, report rcvd by STEVE Vallejo. Patient awaiting to have his CT scan done.

## 2020-10-07 NOTE — ED INITIAL ASSESSMENT (HPI)
Pt to ed from home with c/o about irreg blood pressure reading tonight, pt also having some pain to l arm, pressure to head, cold and clammy feeling.

## 2020-10-07 NOTE — ED PROVIDER NOTES
Patient Seen in: [facility] [place]      History   Patient presents with:  Hypertension    Stated Complaint: Per pt \"headache around 10 pm, then hypertension\"    HPI    At about 10 PM the patient noted onset of low pressure in hi 2340 98 %   O2 Device 10/07/20 0115 None (Room air)       Current:/67   Pulse 56   Temp 98 °F (36.7 °C)   Resp 16   Ht 172.7 cm (5' 8\")   Wt 70.3 kg   SpO2 96%   BMI 23.57 kg/m²         Physical Exam  Appears generally healthy and resting comfortabl vertigo. Advised to use his meclizine for recurrence and follow-up with family doctor in the office.               Disposition and Plan     Clinical Impression:  Transient hypertension  (primary encounter diagnosis)    Disposition:  Discharge  10/7/2020  2

## 2020-10-22 ENCOUNTER — OFFICE VISIT (OUTPATIENT)
Dept: CARDIOLOGY | Age: 70
End: 2020-10-22

## 2020-10-22 VITALS
SYSTOLIC BLOOD PRESSURE: 154 MMHG | HEART RATE: 64 BPM | BODY MASS INDEX: 22.58 KG/M2 | HEIGHT: 68 IN | DIASTOLIC BLOOD PRESSURE: 76 MMHG | WEIGHT: 149 LBS

## 2020-10-22 DIAGNOSIS — I10 ESSENTIAL HYPERTENSION: Primary | ICD-10-CM

## 2020-10-22 PROCEDURE — 99214 OFFICE O/P EST MOD 30 MIN: CPT | Performed by: NURSE PRACTITIONER

## 2020-10-22 RX ORDER — LOSARTAN POTASSIUM 25 MG/1
25 TABLET ORAL DAILY
Qty: 30 TABLET | Refills: 11 | Status: SHIPPED | OUTPATIENT
Start: 2020-10-22 | End: 2021-10-22

## 2020-10-22 ASSESSMENT — PATIENT HEALTH QUESTIONNAIRE - PHQ9
1. LITTLE INTEREST OR PLEASURE IN DOING THINGS: NOT AT ALL
CLINICAL INTERPRETATION OF PHQ2 SCORE: NO FURTHER SCREENING NEEDED
SUM OF ALL RESPONSES TO PHQ9 QUESTIONS 1 AND 2: 0
CLINICAL INTERPRETATION OF PHQ9 SCORE: NO FURTHER SCREENING NEEDED
2. FEELING DOWN, DEPRESSED OR HOPELESS: NOT AT ALL
SUM OF ALL RESPONSES TO PHQ9 QUESTIONS 1 AND 2: 0

## 2020-10-22 ASSESSMENT — ENCOUNTER SYMPTOMS
CHILLS: 0
NEUROLOGICAL NEGATIVE: 1
DECREASED APPETITE: 0
SYNCOPE: 0
WEIGHT GAIN: 0
ALLERGIC/IMMUNOLOGIC COMMENTS: NO NEW FOOD ALLERGIES
GASTROINTESTINAL NEGATIVE: 1
HEMATOCHEZIA: 0
BRUISES/BLEEDS EASILY: 0
FEVER: 0
DIAPHORESIS: 0
WEIGHT LOSS: 0
COUGH: 0
SUSPICIOUS LESIONS: 0
HEMOPTYSIS: 0
SHORTNESS OF BREATH: 0

## 2020-11-11 ENCOUNTER — OFFICE VISIT (OUTPATIENT)
Dept: FAMILY MEDICINE CLINIC | Facility: CLINIC | Age: 70
End: 2020-11-11
Payer: MEDICARE

## 2020-11-11 VITALS
OXYGEN SATURATION: 99 % | DIASTOLIC BLOOD PRESSURE: 60 MMHG | TEMPERATURE: 98 F | HEART RATE: 63 BPM | WEIGHT: 147.19 LBS | SYSTOLIC BLOOD PRESSURE: 126 MMHG | RESPIRATION RATE: 18 BRPM | BODY MASS INDEX: 22.83 KG/M2 | HEIGHT: 67.32 IN

## 2020-11-11 DIAGNOSIS — E78.5 DYSLIPIDEMIA: ICD-10-CM

## 2020-11-11 DIAGNOSIS — Z12.5 SCREENING FOR MALIGNANT NEOPLASM OF PROSTATE: ICD-10-CM

## 2020-11-11 DIAGNOSIS — N40.0 BPH WITH ELEVATED PSA: ICD-10-CM

## 2020-11-11 DIAGNOSIS — F17.210 CIGARETTE NICOTINE DEPENDENCE WITHOUT COMPLICATION: ICD-10-CM

## 2020-11-11 DIAGNOSIS — Z00.00 ENCOUNTER FOR ANNUAL HEALTH EXAMINATION: Primary | ICD-10-CM

## 2020-11-11 DIAGNOSIS — Z23 NEED FOR VACCINATION: ICD-10-CM

## 2020-11-11 DIAGNOSIS — I25.10 CAD S/P PERCUTANEOUS CORONARY ANGIOPLASTY: ICD-10-CM

## 2020-11-11 DIAGNOSIS — Z12.2 ENCOUNTER FOR SCREENING FOR LUNG CANCER: ICD-10-CM

## 2020-11-11 DIAGNOSIS — J43.2 CENTRILOBULAR EMPHYSEMA (HCC): ICD-10-CM

## 2020-11-11 DIAGNOSIS — Z98.61 CAD S/P PERCUTANEOUS CORONARY ANGIOPLASTY: ICD-10-CM

## 2020-11-11 DIAGNOSIS — R97.20 BPH WITH ELEVATED PSA: ICD-10-CM

## 2020-11-11 DIAGNOSIS — F17.200 SMOKER: ICD-10-CM

## 2020-11-11 DIAGNOSIS — R00.8 SUPRAVENTRICULAR BIGEMINY: ICD-10-CM

## 2020-11-11 PROCEDURE — 99214 OFFICE O/P EST MOD 30 MIN: CPT | Performed by: FAMILY MEDICINE

## 2020-11-11 PROCEDURE — 90662 IIV NO PRSV INCREASED AG IM: CPT | Performed by: FAMILY MEDICINE

## 2020-11-11 PROCEDURE — G0439 PPPS, SUBSEQ VISIT: HCPCS | Performed by: FAMILY MEDICINE

## 2020-11-11 PROCEDURE — G0008 ADMIN INFLUENZA VIRUS VAC: HCPCS | Performed by: FAMILY MEDICINE

## 2020-11-11 RX ORDER — LOSARTAN POTASSIUM 25 MG/1
25 TABLET ORAL DAILY
COMMUNITY
Start: 2020-10-22

## 2020-11-11 RX ORDER — BUPROPION HYDROCHLORIDE 150 MG/1
150 TABLET ORAL DAILY
Qty: 90 TABLET | Refills: 1 | Status: SHIPPED | OUTPATIENT
Start: 2020-11-11 | End: 2021-11-15

## 2020-11-11 NOTE — PATIENT INSTRUCTIONS
Call to schedule CT scan and fasting blood tests    Start buproprion once daily with food in AM for smoking cessation    Followup in 3-6 months, sooner if needed      Stephanie Hartman's SCREENING SCHEDULE   Tests on this list are recommended by your physi more than 100 cigarettes in their lifetime   • Anyone with a family history    Colorectal Cancer Screening Covered up to Age 76     Colonoscopy Screen   Covered every 10 years- more often if abnormal Colonoscopy due on 06/20/2021 Update [facility] concentrates   Clients of institutions for the mentally retarded   Persons who live in the same house as a HepB virus carrier   Homosexual men   Illicit injectable drug abusers     Tetanus Toxoid- Only covered with a cut with metal- TD and TDaP Not covered

## 2020-11-11 NOTE — PROGRESS NOTES
HPI:   Nubia Shin is a 78year old male who presents for a Medicare Initial Annual Wellness visit (Once after 12 month Medicare anniversary) . His last annual assessment has been over 1 year: Annual Physical due on 10/13/2018      1.  Encounter for to Face with patient and Family/surrogate (if present), and forms available to patient in AVS         He currently smokes tobacco.  Social History    Tobacco Use      Smoking status: Current Some Day Smoker        Packs/day: 0.50        Years: 40.00 Date    WBC 7.1 10/07/2020    HGB 14.1 10/07/2020    .0 10/07/2020        ALLERGIES:   He has No Known Allergies. CURRENT MEDICATIONS:     •  Losartan Potassium 25 MG Oral Tab, Take 25 mg by mouth daily.     •  Meclizine HCl 25 MG Oral Tab, Take 1 General Appearance:  Alert, cooperative, no distress, appears stated age   Head:  Normocephalic, without obvious abnormality, atraumatic   Eyes:  PERRL, conjunctiva/corneas clear, EOM's intact, both eyes   Ears:  Normal TM's and external ear canal PSA    Centrilobular emphysema (H[facility])  Smoker  -counseled on smoking cessation  -interested in wellbutrin  risks and side effects of med discussed, patient expressed understanding  -check CT screening for lung CA  -f/u 2-3 months after starting    CAD S/P p visit. No flowsheet data found. Fecal Occult Blood Annually No results found for: FOB No flowsheet data found. Glaucoma Screening      Ophthalmology Visit Annually: Diabetics, FHx Glaucoma, AA>50, > 65 No flowsheet data found.     Prostate Ca

## 2020-11-21 ENCOUNTER — HOSPITAL ENCOUNTER (OUTPATIENT)
Dept: CT IMAGING | Age: 70
Discharge: HOME | End: 2020-11-21
Attending: FAMILY MEDICINE
Payer: MEDICARE

## 2020-11-21 ENCOUNTER — LAB ENCOUNTER (OUTPATIENT)
Dept: LAB | Age: 70
End: 2020-11-21
Attending: FAMILY MEDICINE
Payer: MEDICARE

## 2020-11-21 DIAGNOSIS — Z12.5 SCREENING FOR MALIGNANT NEOPLASM OF PROSTATE: ICD-10-CM

## 2020-11-21 DIAGNOSIS — E78.5 DYSLIPIDEMIA: ICD-10-CM

## 2020-11-21 DIAGNOSIS — F17.210 CIGARETTE NICOTINE DEPENDENCE WITHOUT COMPLICATION: ICD-10-CM

## 2020-11-21 DIAGNOSIS — Z12.2 ENCOUNTER FOR SCREENING FOR LUNG CANCER: ICD-10-CM

## 2020-11-21 PROCEDURE — 80053 COMPREHEN METABOLIC PANEL: CPT

## 2020-11-21 PROCEDURE — 80061 LIPID PANEL: CPT

## 2020-11-21 PROCEDURE — 36415 COLL VENOUS BLD VENIPUNCTURE: CPT

## 2021-01-26 ENCOUNTER — APPOINTMENT (OUTPATIENT)
Dept: CARDIOLOGY | Age: 71
End: 2021-01-26

## 2021-02-01 DIAGNOSIS — Z23 NEED FOR VACCINATION: ICD-10-CM

## 2021-02-07 ENCOUNTER — IMMUNIZATION (OUTPATIENT)
Dept: LAB | Age: 71
End: 2021-02-07
Attending: HOSPITALIST
Payer: MEDICARE

## 2021-02-07 DIAGNOSIS — Z23 NEED FOR VACCINATION: Primary | ICD-10-CM

## 2021-02-07 PROCEDURE — 0001A SARSCOV2 VAC 30MCG/0.3ML IM: CPT

## 2021-02-11 ENCOUNTER — OFFICE VISIT (OUTPATIENT)
Dept: CARDIOLOGY | Age: 71
End: 2021-02-11

## 2021-02-11 VITALS
WEIGHT: 153 LBS | HEART RATE: 74 BPM | DIASTOLIC BLOOD PRESSURE: 56 MMHG | BODY MASS INDEX: 23.19 KG/M2 | SYSTOLIC BLOOD PRESSURE: 118 MMHG | HEIGHT: 68 IN

## 2021-02-11 DIAGNOSIS — I49.3 PVCS (PREMATURE VENTRICULAR CONTRACTIONS): ICD-10-CM

## 2021-02-11 DIAGNOSIS — E78.00 PURE HYPERCHOLESTEROLEMIA: Primary | ICD-10-CM

## 2021-02-11 DIAGNOSIS — I25.10 CORONARY ARTERY DISEASE INVOLVING NATIVE CORONARY ARTERY OF NATIVE HEART WITHOUT ANGINA PECTORIS: ICD-10-CM

## 2021-02-11 DIAGNOSIS — R00.2 PALPITATIONS: ICD-10-CM

## 2021-02-11 DIAGNOSIS — I65.23 ASYMPTOMATIC CAROTID ARTERY STENOSIS, BILATERAL: ICD-10-CM

## 2021-02-11 DIAGNOSIS — R55 VASOVAGAL SYNCOPE: ICD-10-CM

## 2021-02-11 PROCEDURE — 99214 OFFICE O/P EST MOD 30 MIN: CPT | Performed by: INTERNAL MEDICINE

## 2021-02-11 SDOH — HEALTH STABILITY: MENTAL HEALTH: HOW OFTEN DO YOU HAVE A DRINK CONTAINING ALCOHOL?: 2-4 TIMES A MONTH

## 2021-02-11 SDOH — HEALTH STABILITY: MENTAL HEALTH: HOW MANY STANDARD DRINKS CONTAINING ALCOHOL DO YOU HAVE ON A TYPICAL DAY?: 1 OR 2

## 2021-02-11 SDOH — HEALTH STABILITY: PHYSICAL HEALTH: ON AVERAGE, HOW MANY DAYS PER WEEK DO YOU ENGAGE IN MODERATE TO STRENUOUS EXERCISE (LIKE A BRISK WALK)?: 3 DAYS

## 2021-02-11 SDOH — HEALTH STABILITY: PHYSICAL HEALTH: ON AVERAGE, HOW MANY MINUTES DO YOU ENGAGE IN EXERCISE AT THIS LEVEL?: 30 MIN

## 2021-02-11 ASSESSMENT — PATIENT HEALTH QUESTIONNAIRE - PHQ9
2. FEELING DOWN, DEPRESSED OR HOPELESS: NOT AT ALL
SUM OF ALL RESPONSES TO PHQ9 QUESTIONS 1 AND 2: 0
CLINICAL INTERPRETATION OF PHQ2 SCORE: NO FURTHER SCREENING NEEDED
SUM OF ALL RESPONSES TO PHQ9 QUESTIONS 1 AND 2: 0
1. LITTLE INTEREST OR PLEASURE IN DOING THINGS: NOT AT ALL
SUM OF ALL RESPONSES TO PHQ9 QUESTIONS 1 AND 2: 0
2. FEELING DOWN, DEPRESSED OR HOPELESS: NOT AT ALL
1. LITTLE INTEREST OR PLEASURE IN DOING THINGS: NOT AT ALL
CLINICAL INTERPRETATION OF PHQ9 SCORE: NO FURTHER SCREENING NEEDED
CLINICAL INTERPRETATION OF PHQ2 SCORE: NO FURTHER SCREENING NEEDED

## 2021-02-13 ENCOUNTER — HOSPITAL ENCOUNTER (OUTPATIENT)
Dept: CV DIAGNOSTICS | Facility: HOSPITAL | Age: 71
Discharge: HOME | End: 2021-02-13
Attending: INTERNAL MEDICINE
Payer: MEDICARE

## 2021-02-13 DIAGNOSIS — R55 SYNCOPE: ICD-10-CM

## 2021-02-13 DIAGNOSIS — I65.01 OCCLUSION AND STENOSIS OF RIGHT VERTEBRAL ARTERY: ICD-10-CM

## 2021-02-13 DIAGNOSIS — E78.00 PURE HYPERCHOLESTEROLEMIA: ICD-10-CM

## 2021-02-13 DIAGNOSIS — R00.2 PALPITATIONS: ICD-10-CM

## 2021-02-13 DIAGNOSIS — I25.10 CORONARY ARTERY DISEASE: ICD-10-CM

## 2021-02-13 DIAGNOSIS — I49.3 PVC (PREMATURE VENTRICULAR CONTRACTION): ICD-10-CM

## 2021-02-13 PROCEDURE — 93270 REMOTE 30 DAY ECG REV/REPORT: CPT | Performed by: INTERNAL MEDICINE

## 2021-02-13 PROCEDURE — 93271 ECG/MONITORING AND ANALYSIS: CPT | Performed by: INTERNAL MEDICINE

## 2021-02-13 PROCEDURE — 93272 ECG/REVIEW INTERPRET ONLY: CPT | Performed by: INTERNAL MEDICINE

## 2021-02-28 ENCOUNTER — IMMUNIZATION (OUTPATIENT)
Dept: LAB | Age: 71
End: 2021-02-28
Attending: HOSPITALIST
Payer: MEDICARE

## 2021-02-28 DIAGNOSIS — Z23 NEED FOR VACCINATION: Primary | ICD-10-CM

## 2021-02-28 PROCEDURE — 0002A SARSCOV2 VAC 30MCG/0.3ML IM: CPT

## 2021-03-01 ENCOUNTER — LAB ENCOUNTER (OUTPATIENT)
Dept: LAB | Age: 71
End: 2021-03-01
Attending: INTERNAL MEDICINE
Payer: MEDICARE

## 2021-03-01 DIAGNOSIS — I65.23 BILATERAL CAROTID ARTERY STENOSIS: ICD-10-CM

## 2021-03-01 DIAGNOSIS — I25.10 CORONARY ATHEROSCLEROSIS OF NATIVE CORONARY ARTERY: ICD-10-CM

## 2021-03-01 DIAGNOSIS — E78.00 PURE HYPERCHOLESTEROLEMIA: Primary | ICD-10-CM

## 2021-03-01 DIAGNOSIS — I49.3 VENTRICULAR PREMATURE BEATS: ICD-10-CM

## 2021-03-01 LAB
ALBUMIN SERPL-MCNC: 3.6 G/DL (ref 3.4–5)
ALBUMIN/GLOB SERPL: 1.2 {RATIO} (ref 1–2)
ALP LIVER SERPL-CCNC: 63 U/L
ALT SERPL-CCNC: 17 U/L
ANION GAP SERPL CALC-SCNC: 4 MMOL/L (ref 0–18)
AST SERPL-CCNC: 17 U/L (ref 15–37)
BASOPHILS # BLD AUTO: 0.04 X10(3) UL (ref 0–0.2)
BASOPHILS NFR BLD AUTO: 0.6 %
BILIRUB SERPL-MCNC: 0.5 MG/DL (ref 0.1–2)
BUN BLD-MCNC: 12 MG/DL (ref 7–18)
BUN/CREAT SERPL: 14.1 (ref 10–20)
CALCIUM BLD-MCNC: 8.8 MG/DL (ref 8.5–10.1)
CHLORIDE SERPL-SCNC: 110 MMOL/L (ref 98–112)
CHOLEST SMN-MCNC: 124 MG/DL (ref ?–200)
CO2 SERPL-SCNC: 28 MMOL/L (ref 21–32)
CREAT BLD-MCNC: 0.85 MG/DL
DEPRECATED RDW RBC AUTO: 44.8 FL (ref 35.1–46.3)
EOSINOPHIL # BLD AUTO: 0.16 X10(3) UL (ref 0–0.7)
EOSINOPHIL NFR BLD AUTO: 2.6 %
ERYTHROCYTE [DISTWIDTH] IN BLOOD BY AUTOMATED COUNT: 12.4 % (ref 11–15)
GLOBULIN PLAS-MCNC: 3.1 G/DL (ref 2.8–4.4)
GLUCOSE BLD-MCNC: 99 MG/DL (ref 70–99)
HAV IGM SER QL: 2.3 MG/DL (ref 1.6–2.6)
HCT VFR BLD AUTO: 42.2 %
HDLC SERPL-MCNC: 67 MG/DL (ref 40–59)
HGB BLD-MCNC: 13.9 G/DL
IMM GRANULOCYTES # BLD AUTO: 0.01 X10(3) UL (ref 0–1)
IMM GRANULOCYTES NFR BLD: 0.2 %
LDLC SERPL CALC-MCNC: 44 MG/DL (ref ?–100)
LYMPHOCYTES # BLD AUTO: 2.36 X10(3) UL (ref 1–4)
LYMPHOCYTES NFR BLD AUTO: 37.7 %
M PROTEIN MFR SERPL ELPH: 6.7 G/DL (ref 6.4–8.2)
MCH RBC QN AUTO: 31.8 PG (ref 26–34)
MCHC RBC AUTO-ENTMCNC: 32.9 G/DL (ref 31–37)
MCV RBC AUTO: 96.6 FL
MONOCYTES # BLD AUTO: 0.42 X10(3) UL (ref 0.1–1)
MONOCYTES NFR BLD AUTO: 6.7 %
NEUTROPHILS # BLD AUTO: 3.27 X10 (3) UL (ref 1.5–7.7)
NEUTROPHILS # BLD AUTO: 3.27 X10(3) UL (ref 1.5–7.7)
NEUTROPHILS NFR BLD AUTO: 52.2 %
NONHDLC SERPL-MCNC: 57 MG/DL (ref ?–130)
OSMOLALITY SERPL CALC.SUM OF ELEC: 294 MOSM/KG (ref 275–295)
PATIENT FASTING Y/N/NP: YES
PATIENT FASTING Y/N/NP: YES
PLATELET # BLD AUTO: 210 10(3)UL (ref 150–450)
POTASSIUM SERPL-SCNC: 4 MMOL/L (ref 3.5–5.1)
RBC # BLD AUTO: 4.37 X10(6)UL
SODIUM SERPL-SCNC: 142 MMOL/L (ref 136–145)
T4 FREE SERPL-MCNC: 0.9 NG/DL (ref 0.8–1.7)
TRIGL SERPL-MCNC: 63 MG/DL (ref 30–149)
TSI SER-ACNC: 1.15 MIU/ML (ref 0.36–3.74)
VLDLC SERPL CALC-MCNC: 13 MG/DL (ref 0–30)
WBC # BLD AUTO: 6.3 X10(3) UL (ref 4–11)

## 2021-03-01 PROCEDURE — 83735 ASSAY OF MAGNESIUM: CPT

## 2021-03-01 PROCEDURE — 85025 COMPLETE CBC W/AUTO DIFF WBC: CPT

## 2021-03-01 PROCEDURE — 84443 ASSAY THYROID STIM HORMONE: CPT

## 2021-03-01 PROCEDURE — 36415 COLL VENOUS BLD VENIPUNCTURE: CPT

## 2021-03-01 PROCEDURE — 80053 COMPREHEN METABOLIC PANEL: CPT

## 2021-03-01 PROCEDURE — 84439 ASSAY OF FREE THYROXINE: CPT

## 2021-03-01 PROCEDURE — 80061 LIPID PANEL: CPT

## 2021-03-08 ENCOUNTER — TELEPHONE (OUTPATIENT)
Dept: FAMILY MEDICINE CLINIC | Facility: CLINIC | Age: 71
End: 2021-03-08

## 2021-03-08 RX ORDER — MECLIZINE HYDROCHLORIDE 25 MG/1
25 TABLET ORAL 3 TIMES DAILY PRN
Qty: 90 TABLET | Refills: 1 | Status: SHIPPED | OUTPATIENT
Start: 2021-03-08

## 2021-03-16 ENCOUNTER — TELEPHONE (OUTPATIENT)
Dept: CARDIOLOGY | Age: 71
End: 2021-03-16

## 2021-03-16 ENCOUNTER — HOSPITAL ENCOUNTER (OUTPATIENT)
Dept: CV DIAGNOSTICS | Age: 71
Discharge: HOME | End: 2021-03-16
Attending: INTERNAL MEDICINE
Payer: MEDICARE

## 2021-03-16 DIAGNOSIS — I49.3 PVC'S (PREMATURE VENTRICULAR CONTRACTIONS): ICD-10-CM

## 2021-03-16 DIAGNOSIS — R00.2 PALPITATIONS: ICD-10-CM

## 2021-03-16 DIAGNOSIS — R55 VASOVAGAL SYNCOPE: ICD-10-CM

## 2021-03-16 DIAGNOSIS — I65.23 ASYMPTOMATIC CAROTID ARTERY STENOSIS, BILATERAL: ICD-10-CM

## 2021-03-16 DIAGNOSIS — I25.10 CORONARY ARTERY DISEASE INVOLVING NATIVE CORONARY ARTERY OF NATIVE HEART WITHOUT ANGINA PECTORIS: ICD-10-CM

## 2021-03-16 DIAGNOSIS — E78.00 PURE HYPERCHOLESTEROLEMIA: ICD-10-CM

## 2021-03-16 PROCEDURE — 93880 EXTRACRANIAL BILAT STUDY: CPT | Performed by: INTERNAL MEDICINE

## 2021-03-16 PROCEDURE — 93306 TTE W/DOPPLER COMPLETE: CPT | Performed by: INTERNAL MEDICINE

## 2021-03-23 ENCOUNTER — TELEPHONE (OUTPATIENT)
Dept: CARDIOLOGY | Age: 71
End: 2021-03-23

## 2021-03-31 ENCOUNTER — TELEPHONE (OUTPATIENT)
Dept: CARDIOLOGY | Age: 71
End: 2021-03-31

## 2021-08-12 ENCOUNTER — APPOINTMENT (OUTPATIENT)
Dept: CARDIOLOGY | Age: 71
End: 2021-08-12

## 2021-10-04 ENCOUNTER — LAB ENCOUNTER (OUTPATIENT)
Dept: LAB | Age: 71
End: 2021-10-04
Attending: INTERNAL MEDICINE
Payer: MEDICARE

## 2021-10-04 DIAGNOSIS — E78.00 PURE HYPERCHOLESTEROLEMIA: Primary | ICD-10-CM

## 2021-10-04 DIAGNOSIS — R55 SYNCOPE: ICD-10-CM

## 2021-10-04 PROCEDURE — 36415 COLL VENOUS BLD VENIPUNCTURE: CPT

## 2021-10-04 PROCEDURE — 80061 LIPID PANEL: CPT

## 2021-10-04 PROCEDURE — 80053 COMPREHEN METABOLIC PANEL: CPT

## 2021-11-15 ENCOUNTER — OFFICE VISIT (OUTPATIENT)
Dept: FAMILY MEDICINE CLINIC | Facility: CLINIC | Age: 71
End: 2021-11-15
Payer: MEDICARE

## 2021-11-15 VITALS
DIASTOLIC BLOOD PRESSURE: 58 MMHG | WEIGHT: 147 LBS | HEIGHT: 66.54 IN | BODY MASS INDEX: 23.35 KG/M2 | HEART RATE: 66 BPM | SYSTOLIC BLOOD PRESSURE: 134 MMHG | TEMPERATURE: 98 F | OXYGEN SATURATION: 98 %

## 2021-11-15 DIAGNOSIS — Z00.00 ENCOUNTER FOR ANNUAL HEALTH EXAMINATION: Primary | ICD-10-CM

## 2021-11-15 DIAGNOSIS — Z23 NEED FOR VACCINATION: ICD-10-CM

## 2021-11-15 DIAGNOSIS — E78.5 DYSLIPIDEMIA: ICD-10-CM

## 2021-11-15 DIAGNOSIS — Z12.2 ENCOUNTER FOR SCREENING FOR LUNG CANCER: ICD-10-CM

## 2021-11-15 DIAGNOSIS — R07.89 ATYPICAL CHEST PAIN: ICD-10-CM

## 2021-11-15 DIAGNOSIS — Z12.5 SCREENING FOR MALIGNANT NEOPLASM OF PROSTATE: ICD-10-CM

## 2021-11-15 DIAGNOSIS — F17.200 SMOKER: ICD-10-CM

## 2021-11-15 DIAGNOSIS — Z12.11 ENCOUNTER FOR SCREENING FOR MALIGNANT NEOPLASM OF COLON: ICD-10-CM

## 2021-11-15 DIAGNOSIS — J43.2 CENTRILOBULAR EMPHYSEMA (HCC): ICD-10-CM

## 2021-11-15 DIAGNOSIS — Z98.61 CAD S/P PERCUTANEOUS CORONARY ANGIOPLASTY: ICD-10-CM

## 2021-11-15 DIAGNOSIS — I25.10 CAD S/P PERCUTANEOUS CORONARY ANGIOPLASTY: ICD-10-CM

## 2021-11-15 DIAGNOSIS — N40.0 BPH WITH ELEVATED PSA: ICD-10-CM

## 2021-11-15 DIAGNOSIS — R97.20 BPH WITH ELEVATED PSA: ICD-10-CM

## 2021-11-15 DIAGNOSIS — R00.8 SUPRAVENTRICULAR BIGEMINY: ICD-10-CM

## 2021-11-15 DIAGNOSIS — Z87.891 PERSONAL HISTORY OF NICOTINE DEPENDENCE: ICD-10-CM

## 2021-11-15 PROCEDURE — 90662 IIV NO PRSV INCREASED AG IM: CPT | Performed by: FAMILY MEDICINE

## 2021-11-15 PROCEDURE — G0439 PPPS, SUBSEQ VISIT: HCPCS | Performed by: FAMILY MEDICINE

## 2021-11-15 PROCEDURE — 93000 ELECTROCARDIOGRAM COMPLETE: CPT | Performed by: FAMILY MEDICINE

## 2021-11-15 PROCEDURE — 99215 OFFICE O/P EST HI 40 MIN: CPT | Performed by: FAMILY MEDICINE

## 2021-11-15 PROCEDURE — G0008 ADMIN INFLUENZA VIRUS VAC: HCPCS | Performed by: FAMILY MEDICINE

## 2021-11-15 NOTE — PROGRESS NOTES
HPI:   Nubia Shin is a 73year old male who presents for a Medicare Initial Annual Wellness visit (Once after 12 month Medicare anniversary) . His last annual assessment has been over 1 year: Annual Physical due on 10/13/2018      1.  Encounter for currently smokes tobacco.  Social History    Tobacco Use      Smoking status: Current Some Day Smoker        Packs/day: 0.50        Years: 40.00        Pack years: 20        Types: Cigarettes      Smokeless tobacco: Never Used      Tobacco comment: 10 ciga other day. Meclizine HCl 25 MG Oral Tab, Take 1 tablet (25 mg total) by mouth 3 (three) times daily as needed for Dizziness. Losartan Potassium 25 MG Oral Tab, Take 25 mg by mouth daily.   Cholecalciferol (VITAMIN D) 1000 units Oral Tab, Take 1 tablet by Eyes:  PERRL, conjunctiva/corneas clear, EOM's intact, both eyes   Ears:  Normal TM's and external ear canals, both ears   Nose: Nares normal, septum midline, mucosa normal, no drainage or sinus tenderness   Throat: Lips, mucosa, and tongue normal; teeth angioplasty  Dyslipidemia  Supraventricular bigeminy  Atypical chest pain  -EKG in office today is normal - no change compared to prior  -recommend calling cardiology to review symptoms  -would consider nuclear stress possibly given the character of his sy flowsheet data found. Fecal Occult Blood Annually No results found for: FOB No flowsheet data found. Glaucoma Screening      Ophthalmology Visit Annually: Diabetics, FHx Glaucoma, AA>50, > 65 No flowsheet data found.     Prostate Cancer Scree

## 2021-11-15 NOTE — PATIENT INSTRUCTIONS
Go to lab whenever you are able to check your PSA level    Call to update Dr. Nelson Saba on your symptoms    Call to schedule colonoscopy    Call to schedule lung scan    Followup with me in 6 months, sooner if needed      [facility] Results   Component Value Date    PSA 0.762 07/16/2013     PSA due on 11/21/2022   Immunizations    Influenza Covered once per flu season  Please get every year -  No recommendations at this time    Pneumococcal Each vaccine (Sazrlxm02 & [id number]) cover are also available in [place]  www. putitinwriting. org  This link also has information from the [facility] regarding Advance Directives.

## 2021-11-23 ENCOUNTER — HOSPITAL ENCOUNTER (OUTPATIENT)
Dept: CT IMAGING | Age: 71
Discharge: HOME | End: 2021-11-23
Attending: FAMILY MEDICINE
Payer: MEDICARE

## 2021-11-23 DIAGNOSIS — Z87.891 PERSONAL HISTORY OF NICOTINE DEPENDENCE: ICD-10-CM

## 2021-11-23 DIAGNOSIS — Z12.2 ENCOUNTER FOR SCREENING FOR LUNG CANCER: ICD-10-CM

## 2021-11-23 PROCEDURE — 71271 CT THORAX LUNG CANCER SCR C-: CPT | Performed by: FAMILY MEDICINE

## 2021-11-26 ENCOUNTER — LAB ENCOUNTER (OUTPATIENT)
Dept: LAB | Age: 71
End: 2021-11-26
Attending: FAMILY MEDICINE
Payer: MEDICARE

## 2021-11-26 DIAGNOSIS — Z12.5 SCREENING FOR MALIGNANT NEOPLASM OF PROSTATE: ICD-10-CM

## 2021-11-26 PROCEDURE — 36415 COLL VENOUS BLD VENIPUNCTURE: CPT

## 2021-12-31 ENCOUNTER — LAB ENCOUNTER (OUTPATIENT)
Dept: LAB | Age: 71
End: 2021-12-31
Attending: STUDENT IN AN ORGANIZED HEALTH CARE EDUCATION/TRAINING PROGRAM
Payer: MEDICARE

## 2021-12-31 DIAGNOSIS — Z98.890 HISTORY OF COLONOSCOPY: ICD-10-CM

## 2021-12-31 LAB — SARS-COV-2 RNA RESP QL NAA+PROBE: NOT DETECTED

## 2022-01-03 PROBLEM — K63.5 COLON POLYPS: Status: ACTIVE | Noted: 2022-01-03

## 2022-01-03 PROBLEM — Z12.11 SPECIAL SCREENING FOR MALIGNANT NEOPLASM OF COLON: Status: ACTIVE | Noted: 2022-01-03

## 2022-01-05 ENCOUNTER — PATIENT OUTREACH (OUTPATIENT)
Dept: CASE MANAGEMENT | Age: 72
End: 2022-01-05

## 2022-05-16 ENCOUNTER — OFFICE VISIT (OUTPATIENT)
Dept: FAMILY MEDICINE CLINIC | Facility: CLINIC | Age: 72
End: 2022-05-16
Payer: MEDICARE

## 2022-05-16 VITALS
BODY MASS INDEX: 23 KG/M2 | OXYGEN SATURATION: 98 % | HEART RATE: 62 BPM | TEMPERATURE: 97 F | WEIGHT: 150 LBS | DIASTOLIC BLOOD PRESSURE: 60 MMHG | SYSTOLIC BLOOD PRESSURE: 132 MMHG

## 2022-05-16 DIAGNOSIS — Z98.61 CAD S/P PERCUTANEOUS CORONARY ANGIOPLASTY: Primary | ICD-10-CM

## 2022-05-16 DIAGNOSIS — F17.200 SMOKER: ICD-10-CM

## 2022-05-16 DIAGNOSIS — R07.89 ATYPICAL CHEST PAIN: ICD-10-CM

## 2022-05-16 DIAGNOSIS — I25.10 CAD S/P PERCUTANEOUS CORONARY ANGIOPLASTY: Primary | ICD-10-CM

## 2022-05-16 DIAGNOSIS — R42 VERTIGO: ICD-10-CM

## 2022-05-16 DIAGNOSIS — R00.8 SUPRAVENTRICULAR BIGEMINY: ICD-10-CM

## 2022-05-16 PROCEDURE — 99215 OFFICE O/P EST HI 40 MIN: CPT | Performed by: FAMILY MEDICINE

## 2022-05-16 RX ORDER — MECLIZINE HYDROCHLORIDE 25 MG/1
25 TABLET ORAL 3 TIMES DAILY PRN
Qty: 90 TABLET | Refills: 1 | Status: SHIPPED | OUTPATIENT
Start: 2022-05-16

## 2022-05-16 RX ORDER — FLUTICASONE PROPIONATE 50 MCG
2 SPRAY, SUSPENSION (ML) NASAL DAILY
Qty: 16 G | Refills: 1 | Status: SHIPPED | OUTPATIENT
Start: 2022-05-16 | End: 2022-08-14

## 2022-05-23 ENCOUNTER — ORDER TRANSCRIPTION (OUTPATIENT)
Dept: ADMINISTRATIVE | Facility: HOSPITAL | Age: 72
End: 2022-05-23

## 2022-05-23 DIAGNOSIS — Z01.818 PREOP EXAMINATION: ICD-10-CM

## 2022-05-23 DIAGNOSIS — Z11.59 ENCOUNTER FOR SCREENING FOR OTHER VIRAL DISEASES: Primary | ICD-10-CM

## 2022-05-31 ENCOUNTER — LAB ENCOUNTER (OUTPATIENT)
Dept: LAB | Age: 72
End: 2022-05-31
Attending: INTERNAL MEDICINE
Payer: MEDICARE

## 2022-05-31 DIAGNOSIS — Z11.59 ENCOUNTER FOR SCREENING FOR OTHER VIRAL DISEASES: ICD-10-CM

## 2022-05-31 DIAGNOSIS — Z01.818 PREOP EXAMINATION: ICD-10-CM

## 2022-06-01 LAB — SARS-COV-2 RNA RESP QL NAA+PROBE: NOT DETECTED

## 2022-06-03 ENCOUNTER — HOSPITAL ENCOUNTER (OUTPATIENT)
Dept: CV DIAGNOSTICS | Age: 72
Discharge: HOME | End: 2022-06-03
Attending: INTERNAL MEDICINE
Payer: MEDICARE

## 2022-06-03 DIAGNOSIS — R55 SYNCOPE: ICD-10-CM

## 2022-06-03 DIAGNOSIS — R07.9 CHEST PAIN, UNSPECIFIED TYPE: ICD-10-CM

## 2022-06-03 DIAGNOSIS — I49.3 PVC (PREMATURE VENTRICULAR CONTRACTION): ICD-10-CM

## 2022-06-03 DIAGNOSIS — I25.10 CAD (CORONARY ARTERY DISEASE): ICD-10-CM

## 2022-06-03 DIAGNOSIS — E78.00 PURE HYPERCHOLESTEROLEMIA: ICD-10-CM

## 2022-06-03 DIAGNOSIS — I10 HTN (HYPERTENSION): ICD-10-CM

## 2022-06-03 PROCEDURE — 93017 CV STRESS TEST TRACING ONLY: CPT | Performed by: INTERNAL MEDICINE

## 2022-06-03 PROCEDURE — 93350 STRESS TTE ONLY: CPT | Performed by: INTERNAL MEDICINE

## 2022-06-03 PROCEDURE — 93018 CV STRESS TEST I&R ONLY: CPT | Performed by: INTERNAL MEDICINE

## 2022-06-10 RX ORDER — FLUTICASONE PROPIONATE 50 MCG
SPRAY, SUSPENSION (ML) NASAL
Qty: 16 ML | Refills: 1 | OUTPATIENT
Start: 2022-06-10

## 2022-07-19 ENCOUNTER — LAB ENCOUNTER (OUTPATIENT)
Dept: LAB | Age: 72
End: 2022-07-19
Attending: INTERNAL MEDICINE
Payer: MEDICARE

## 2022-07-19 DIAGNOSIS — E78.00 PURE HYPERCHOLESTEROLEMIA: ICD-10-CM

## 2022-07-19 DIAGNOSIS — R42 DIZZINESS: ICD-10-CM

## 2022-07-19 DIAGNOSIS — I25.10 CORONARY ATHEROSCLEROSIS OF NATIVE CORONARY ARTERY: ICD-10-CM

## 2022-07-19 DIAGNOSIS — I10 HYPERTENSION: ICD-10-CM

## 2022-07-19 DIAGNOSIS — R07.9 CHEST PAIN: ICD-10-CM

## 2022-07-19 DIAGNOSIS — R00.2 PALPITATIONS: ICD-10-CM

## 2022-07-19 DIAGNOSIS — I49.3 PVC'S (PREMATURE VENTRICULAR CONTRACTIONS): ICD-10-CM

## 2022-07-19 DIAGNOSIS — I65.23 ASYMPTOMATIC CAROTID ARTERY STENOSIS, BILATERAL: Primary | ICD-10-CM

## 2022-07-19 LAB
ALBUMIN SERPL-MCNC: 3.7 G/DL (ref 3.4–5)
ALBUMIN/GLOB SERPL: 1.3 {RATIO} (ref 1–2)
ALP LIVER SERPL-CCNC: 49 U/L
ALT SERPL-CCNC: 16 U/L
ANION GAP SERPL CALC-SCNC: 6 MMOL/L (ref 0–18)
AST SERPL-CCNC: 14 U/L (ref 15–37)
BASOPHILS # BLD AUTO: 0.03 X10(3) UL (ref 0–0.2)
BASOPHILS NFR BLD AUTO: 0.5 %
BILIRUB SERPL-MCNC: 0.5 MG/DL (ref 0.1–2)
BUN BLD-MCNC: 15 MG/DL (ref 7–18)
CALCIUM BLD-MCNC: 8.8 MG/DL (ref 8.5–10.1)
CHLORIDE SERPL-SCNC: 112 MMOL/L (ref 98–112)
CHOLEST SERPL-MCNC: 126 MG/DL (ref ?–200)
CO2 SERPL-SCNC: 25 MMOL/L (ref 21–32)
CREAT BLD-MCNC: 0.68 MG/DL
EOSINOPHIL # BLD AUTO: 0.15 X10(3) UL (ref 0–0.7)
EOSINOPHIL NFR BLD AUTO: 2.5 %
ERYTHROCYTE [DISTWIDTH] IN BLOOD BY AUTOMATED COUNT: 13 %
FASTING PATIENT LIPID ANSWER: YES
FASTING STATUS PATIENT QL REPORTED: YES
GLOBULIN PLAS-MCNC: 2.9 G/DL (ref 2.8–4.4)
GLUCOSE BLD-MCNC: 105 MG/DL (ref 70–99)
HCT VFR BLD AUTO: 42.7 %
HDLC SERPL-MCNC: 63 MG/DL (ref 40–59)
HGB BLD-MCNC: 13.8 G/DL
IMM GRANULOCYTES # BLD AUTO: 0.01 X10(3) UL (ref 0–1)
IMM GRANULOCYTES NFR BLD: 0.2 %
LDLC SERPL CALC-MCNC: 51 MG/DL (ref ?–100)
LYMPHOCYTES # BLD AUTO: 2.71 X10(3) UL (ref 1–4)
LYMPHOCYTES NFR BLD AUTO: 44.6 %
MCH RBC QN AUTO: 32.5 PG (ref 26–34)
MCHC RBC AUTO-ENTMCNC: 32.3 G/DL (ref 31–37)
MCV RBC AUTO: 100.5 FL
MONOCYTES # BLD AUTO: 0.4 X10(3) UL (ref 0.1–1)
MONOCYTES NFR BLD AUTO: 6.6 %
NEUTROPHILS # BLD AUTO: 2.78 X10 (3) UL (ref 1.5–7.7)
NEUTROPHILS # BLD AUTO: 2.78 X10(3) UL (ref 1.5–7.7)
NEUTROPHILS NFR BLD AUTO: 45.6 %
NONHDLC SERPL-MCNC: 63 MG/DL (ref ?–130)
OSMOLALITY SERPL CALC.SUM OF ELEC: 297 MOSM/KG (ref 275–295)
PLATELET # BLD AUTO: 195 10(3)UL (ref 150–450)
POTASSIUM SERPL-SCNC: 4 MMOL/L (ref 3.5–5.1)
PROT SERPL-MCNC: 6.6 G/DL (ref 6.4–8.2)
RBC # BLD AUTO: 4.25 X10(6)UL
SODIUM SERPL-SCNC: 143 MMOL/L (ref 136–145)
T4 FREE SERPL-MCNC: 1 NG/DL (ref 0.8–1.7)
TRIGL SERPL-MCNC: 56 MG/DL (ref 30–149)
TSI SER-ACNC: 1.45 MIU/ML (ref 0.36–3.74)
VLDLC SERPL CALC-MCNC: 8 MG/DL (ref 0–30)
WBC # BLD AUTO: 6.1 X10(3) UL (ref 4–11)

## 2022-07-19 PROCEDURE — 84439 ASSAY OF FREE THYROXINE: CPT

## 2022-07-19 PROCEDURE — 85025 COMPLETE CBC W/AUTO DIFF WBC: CPT

## 2022-07-19 PROCEDURE — 80053 COMPREHEN METABOLIC PANEL: CPT

## 2022-07-19 PROCEDURE — 80061 LIPID PANEL: CPT

## 2022-07-19 PROCEDURE — 36415 COLL VENOUS BLD VENIPUNCTURE: CPT

## 2022-07-19 PROCEDURE — 84443 ASSAY THYROID STIM HORMONE: CPT

## 2022-08-29 RX ORDER — FLUTICASONE PROPIONATE 50 MCG
SPRAY, SUSPENSION (ML) NASAL
Qty: 16 ML | Refills: 1 | Status: SHIPPED | OUTPATIENT
Start: 2022-08-29

## 2022-09-15 ENCOUNTER — PATIENT OUTREACH (OUTPATIENT)
Dept: FAMILY MEDICINE CLINIC | Facility: CLINIC | Age: 72
End: 2022-09-15

## 2022-09-15 NOTE — PROGRESS NOTES
Called patient left  to schedule for his annual px.  He is due after 11/15 his last one was 11/15/21

## 2022-09-29 RX ORDER — FLUTICASONE PROPIONATE 50 MCG
SPRAY, SUSPENSION (ML) NASAL
Qty: 16 ML | Refills: 1 | OUTPATIENT
Start: 2022-09-29

## 2022-11-04 RX ORDER — FLUTICASONE PROPIONATE 50 MCG
SPRAY, SUSPENSION (ML) NASAL
Qty: 16 ML | Refills: 1 | Status: SHIPPED | OUTPATIENT
Start: 2022-11-04

## 2022-11-16 ENCOUNTER — OFFICE VISIT (OUTPATIENT)
Dept: FAMILY MEDICINE CLINIC | Facility: CLINIC | Age: 72
End: 2022-11-16
Payer: MEDICARE

## 2022-11-16 VITALS
BODY MASS INDEX: 23.82 KG/M2 | SYSTOLIC BLOOD PRESSURE: 134 MMHG | OXYGEN SATURATION: 96 % | DIASTOLIC BLOOD PRESSURE: 60 MMHG | HEIGHT: 66.54 IN | WEIGHT: 150 LBS | HEART RATE: 64 BPM | TEMPERATURE: 98 F

## 2022-11-16 DIAGNOSIS — R00.8 SUPRAVENTRICULAR BIGEMINY: ICD-10-CM

## 2022-11-16 DIAGNOSIS — R97.20 BPH WITH ELEVATED PSA: ICD-10-CM

## 2022-11-16 DIAGNOSIS — Z13.21 SCREENING FOR ENDOCRINE, NUTRITIONAL, METABOLIC AND IMMUNITY DISORDER: ICD-10-CM

## 2022-11-16 DIAGNOSIS — Z13.228 SCREENING FOR ENDOCRINE, NUTRITIONAL, METABOLIC AND IMMUNITY DISORDER: ICD-10-CM

## 2022-11-16 DIAGNOSIS — Z87.891 PERSONAL HISTORY OF NICOTINE DEPENDENCE: ICD-10-CM

## 2022-11-16 DIAGNOSIS — Z00.00 ENCOUNTER FOR ANNUAL HEALTH EXAMINATION: Primary | ICD-10-CM

## 2022-11-16 DIAGNOSIS — F17.200 SMOKER: ICD-10-CM

## 2022-11-16 DIAGNOSIS — Z98.61 CAD S/P PERCUTANEOUS CORONARY ANGIOPLASTY: ICD-10-CM

## 2022-11-16 DIAGNOSIS — I25.10 CAD S/P PERCUTANEOUS CORONARY ANGIOPLASTY: ICD-10-CM

## 2022-11-16 DIAGNOSIS — J43.2 CENTRILOBULAR EMPHYSEMA (HCC): ICD-10-CM

## 2022-11-16 DIAGNOSIS — Z13.29 SCREENING FOR ENDOCRINE, NUTRITIONAL, METABOLIC AND IMMUNITY DISORDER: ICD-10-CM

## 2022-11-16 DIAGNOSIS — N40.0 BPH WITH ELEVATED PSA: ICD-10-CM

## 2022-11-16 DIAGNOSIS — Z13.0 SCREENING FOR ENDOCRINE, NUTRITIONAL, METABOLIC AND IMMUNITY DISORDER: ICD-10-CM

## 2022-11-16 DIAGNOSIS — Z12.5 ENCOUNTER FOR SCREENING FOR MALIGNANT NEOPLASM OF PROSTATE: ICD-10-CM

## 2022-11-16 DIAGNOSIS — Z12.2 SCREENING FOR LUNG CANCER: ICD-10-CM

## 2022-11-16 PROCEDURE — 90662 IIV NO PRSV INCREASED AG IM: CPT | Performed by: FAMILY MEDICINE

## 2022-11-16 PROCEDURE — G0439 PPPS, SUBSEQ VISIT: HCPCS | Performed by: FAMILY MEDICINE

## 2022-11-16 PROCEDURE — 99214 OFFICE O/P EST MOD 30 MIN: CPT | Performed by: FAMILY MEDICINE

## 2022-11-16 PROCEDURE — G0008 ADMIN INFLUENZA VIRUS VAC: HCPCS | Performed by: FAMILY MEDICINE

## 2022-11-16 PROCEDURE — 1126F AMNT PAIN NOTED NONE PRSNT: CPT | Performed by: FAMILY MEDICINE

## 2022-11-29 ENCOUNTER — HOSPITAL ENCOUNTER (OUTPATIENT)
Dept: CT IMAGING | Age: 72
Discharge: HOME | End: 2022-11-29
Attending: FAMILY MEDICINE
Payer: MEDICARE

## 2022-11-29 ENCOUNTER — APPOINTMENT (OUTPATIENT)
Dept: LAB | Age: 72
End: 2022-11-29
Attending: FAMILY MEDICINE
Payer: MEDICARE

## 2022-11-29 DIAGNOSIS — F17.200 SMOKER: ICD-10-CM

## 2022-11-29 DIAGNOSIS — Z87.891 PERSONAL HISTORY OF NICOTINE DEPENDENCE: ICD-10-CM

## 2022-11-29 DIAGNOSIS — Z12.2 SCREENING FOR LUNG CANCER: ICD-10-CM

## 2022-11-29 PROCEDURE — 71271 CT THORAX LUNG CANCER SCR C-: CPT | Performed by: FAMILY MEDICINE

## 2022-11-30 ENCOUNTER — LAB ENCOUNTER (OUTPATIENT)
Dept: LAB | Age: 72
End: 2022-11-30
Attending: FAMILY MEDICINE
Payer: MEDICARE

## 2022-11-30 DIAGNOSIS — Z13.0 SCREENING FOR ENDOCRINE, NUTRITIONAL, METABOLIC AND IMMUNITY DISORDER: ICD-10-CM

## 2022-11-30 DIAGNOSIS — Z13.21 SCREENING FOR ENDOCRINE, NUTRITIONAL, METABOLIC AND IMMUNITY DISORDER: ICD-10-CM

## 2022-11-30 DIAGNOSIS — Z12.5 ENCOUNTER FOR SCREENING FOR MALIGNANT NEOPLASM OF PROSTATE: ICD-10-CM

## 2022-11-30 DIAGNOSIS — Z13.228 SCREENING FOR ENDOCRINE, NUTRITIONAL, METABOLIC AND IMMUNITY DISORDER: ICD-10-CM

## 2022-11-30 DIAGNOSIS — Z13.29 SCREENING FOR ENDOCRINE, NUTRITIONAL, METABOLIC AND IMMUNITY DISORDER: ICD-10-CM

## 2022-11-30 LAB
ALBUMIN SERPL-MCNC: 3.6 G/DL (ref 3.4–5)
ALBUMIN/GLOB SERPL: 1.2 {RATIO} (ref 1–2)
ALP LIVER SERPL-CCNC: 56 U/L
ALT SERPL-CCNC: 16 U/L
ANION GAP SERPL CALC-SCNC: 3 MMOL/L (ref 0–18)
AST SERPL-CCNC: 16 U/L (ref 15–37)
BILIRUB SERPL-MCNC: 0.6 MG/DL (ref 0.1–2)
BUN BLD-MCNC: 18 MG/DL (ref 7–18)
CALCIUM BLD-MCNC: 9 MG/DL (ref 8.5–10.1)
CHLORIDE SERPL-SCNC: 110 MMOL/L (ref 98–112)
CO2 SERPL-SCNC: 27 MMOL/L (ref 21–32)
COMPLEXED PSA SERPL-MCNC: 1.73 NG/ML (ref ?–4)
CREAT BLD-MCNC: 0.94 MG/DL
FASTING STATUS PATIENT QL REPORTED: YES
GFR SERPLBLD BASED ON 1.73 SQ M-ARVRAT: 87 ML/MIN/1.73M2 (ref 60–?)
GLOBULIN PLAS-MCNC: 3 G/DL (ref 2.8–4.4)
GLUCOSE BLD-MCNC: 101 MG/DL (ref 70–99)
OSMOLALITY SERPL CALC.SUM OF ELEC: 292 MOSM/KG (ref 275–295)
POTASSIUM SERPL-SCNC: 3.9 MMOL/L (ref 3.5–5.1)
PROT SERPL-MCNC: 6.6 G/DL (ref 6.4–8.2)
SODIUM SERPL-SCNC: 140 MMOL/L (ref 136–145)

## 2022-11-30 PROCEDURE — 36415 COLL VENOUS BLD VENIPUNCTURE: CPT

## 2022-11-30 PROCEDURE — 80053 COMPREHEN METABOLIC PANEL: CPT

## 2023-05-04 ENCOUNTER — OFFICE VISIT (OUTPATIENT)
Dept: FAMILY MEDICINE CLINIC | Facility: CLINIC | Age: 73
End: 2023-05-04
Payer: MEDICARE

## 2023-05-04 VITALS
TEMPERATURE: 98 F | HEIGHT: 66.54 IN | WEIGHT: 147 LBS | DIASTOLIC BLOOD PRESSURE: 60 MMHG | BODY MASS INDEX: 23.35 KG/M2 | HEART RATE: 60 BPM | SYSTOLIC BLOOD PRESSURE: 134 MMHG

## 2023-05-04 DIAGNOSIS — Z87.891 PERSONAL HISTORY OF NICOTINE DEPENDENCE: ICD-10-CM

## 2023-05-04 DIAGNOSIS — R42 VERTIGO: ICD-10-CM

## 2023-05-04 DIAGNOSIS — F17.200 SMOKER: ICD-10-CM

## 2023-05-04 DIAGNOSIS — I25.10 CAD S/P PERCUTANEOUS CORONARY ANGIOPLASTY: Primary | ICD-10-CM

## 2023-05-04 DIAGNOSIS — J43.2 CENTRILOBULAR EMPHYSEMA (HCC): ICD-10-CM

## 2023-05-04 DIAGNOSIS — Z98.61 CAD S/P PERCUTANEOUS CORONARY ANGIOPLASTY: Primary | ICD-10-CM

## 2023-05-04 DIAGNOSIS — N40.0 BPH WITH ELEVATED PSA: ICD-10-CM

## 2023-05-04 DIAGNOSIS — R00.8 SUPRAVENTRICULAR BIGEMINY: ICD-10-CM

## 2023-05-04 DIAGNOSIS — R97.20 BPH WITH ELEVATED PSA: ICD-10-CM

## 2023-05-04 PROCEDURE — 99214 OFFICE O/P EST MOD 30 MIN: CPT | Performed by: FAMILY MEDICINE

## 2023-05-04 RX ORDER — BUPROPION HYDROCHLORIDE 150 MG/1
150 TABLET ORAL DAILY
Qty: 30 TABLET | Refills: 0 | Status: SHIPPED | OUTPATIENT
Start: 2023-05-04

## 2023-05-30 RX ORDER — BUPROPION HYDROCHLORIDE 150 MG/1
TABLET ORAL
Qty: 30 TABLET | Refills: 0 | Status: SHIPPED | OUTPATIENT
Start: 2023-05-30

## 2023-06-21 RX ORDER — BUPROPION HYDROCHLORIDE 150 MG/1
150 TABLET ORAL DAILY
Qty: 90 TABLET | Refills: 0 | Status: SHIPPED | OUTPATIENT
Start: 2023-06-21

## 2023-07-24 ENCOUNTER — ORDER TRANSCRIPTION (OUTPATIENT)
Dept: ADMINISTRATIVE | Facility: HOSPITAL | Age: 73
End: 2023-07-24

## 2023-07-24 DIAGNOSIS — Z13.6 SCREENING FOR CARDIOVASCULAR CONDITION: Primary | ICD-10-CM

## 2023-07-25 ENCOUNTER — LAB ENCOUNTER (OUTPATIENT)
Dept: LAB | Age: 73
End: 2023-07-25
Attending: INTERNAL MEDICINE
Payer: MEDICARE

## 2023-07-25 DIAGNOSIS — R00.2 PALPITATIONS: ICD-10-CM

## 2023-07-25 DIAGNOSIS — I25.10 CORONARY ARTERY DISEASE: ICD-10-CM

## 2023-07-25 DIAGNOSIS — I49.3 PVC'S (PREMATURE VENTRICULAR CONTRACTIONS): ICD-10-CM

## 2023-07-25 DIAGNOSIS — I65.23 ASYMPTOMATIC BILATERAL CAROTID ARTERY STENOSIS: ICD-10-CM

## 2023-07-25 DIAGNOSIS — I10 PRIMARY HYPERTENSION: ICD-10-CM

## 2023-07-25 DIAGNOSIS — E78.00 PURE HYPERCHOLESTEROLEMIA: ICD-10-CM

## 2023-07-25 DIAGNOSIS — R55 SYNCOPE: Primary | ICD-10-CM

## 2023-07-25 LAB
ALBUMIN SERPL-MCNC: 3.7 G/DL (ref 3.4–5)
ALBUMIN/GLOB SERPL: 1.2 {RATIO} (ref 1–2)
ALP LIVER SERPL-CCNC: 55 U/L
ALT SERPL-CCNC: 18 U/L
ANION GAP SERPL CALC-SCNC: 7 MMOL/L (ref 0–18)
AST SERPL-CCNC: 19 U/L (ref 15–37)
BILIRUB SERPL-MCNC: 0.7 MG/DL (ref 0.1–2)
BUN BLD-MCNC: 12 MG/DL (ref 7–18)
CALCIUM BLD-MCNC: 8.9 MG/DL (ref 8.5–10.1)
CHLORIDE SERPL-SCNC: 109 MMOL/L (ref 98–112)
CHOLEST SERPL-MCNC: 139 MG/DL (ref ?–200)
CO2 SERPL-SCNC: 24 MMOL/L (ref 21–32)
CREAT BLD-MCNC: 0.91 MG/DL
EGFRCR SERPLBLD CKD-EPI 2021: 90 ML/MIN/1.73M2 (ref 60–?)
FASTING PATIENT LIPID ANSWER: YES
FASTING STATUS PATIENT QL REPORTED: YES
GLOBULIN PLAS-MCNC: 3.2 G/DL (ref 2.8–4.4)
GLUCOSE BLD-MCNC: 99 MG/DL (ref 70–99)
HDLC SERPL-MCNC: 69 MG/DL (ref 40–59)
LDLC SERPL CALC-MCNC: 57 MG/DL (ref ?–100)
NONHDLC SERPL-MCNC: 70 MG/DL (ref ?–130)
OSMOLALITY SERPL CALC.SUM OF ELEC: 290 MOSM/KG (ref 275–295)
POTASSIUM SERPL-SCNC: 3.9 MMOL/L (ref 3.5–5.1)
PROT SERPL-MCNC: 6.9 G/DL (ref 6.4–8.2)
SODIUM SERPL-SCNC: 140 MMOL/L (ref 136–145)
TRIGL SERPL-MCNC: 61 MG/DL (ref 30–149)
TSI SER-ACNC: 1.96 MIU/ML (ref 0.36–3.74)
VLDLC SERPL CALC-MCNC: 9 MG/DL (ref 0–30)

## 2023-07-25 PROCEDURE — 84443 ASSAY THYROID STIM HORMONE: CPT

## 2023-07-25 PROCEDURE — 80053 COMPREHEN METABOLIC PANEL: CPT

## 2023-07-25 PROCEDURE — 80061 LIPID PANEL: CPT

## 2023-07-25 PROCEDURE — 36415 COLL VENOUS BLD VENIPUNCTURE: CPT

## 2023-08-12 ENCOUNTER — HOSPITAL ENCOUNTER (OUTPATIENT)
Dept: CT IMAGING | Age: 73
Discharge: HOME | End: 2023-08-12
Attending: INTERNAL MEDICINE

## 2023-08-12 DIAGNOSIS — Z13.6 SCREENING FOR CARDIOVASCULAR CONDITION: ICD-10-CM

## 2023-09-13 ENCOUNTER — ORDER TRANSCRIPTION (OUTPATIENT)
Dept: ADMINISTRATIVE | Facility: HOSPITAL | Age: 73
End: 2023-09-13

## 2023-09-13 DIAGNOSIS — R93.89 ABNORMAL CAROTID ULTRASOUND: Primary | ICD-10-CM

## 2023-09-20 ENCOUNTER — HOSPITAL ENCOUNTER (OUTPATIENT)
Dept: CT IMAGING | Age: 73
Discharge: HOME | End: 2023-09-20
Attending: INTERNAL MEDICINE
Payer: MEDICARE

## 2023-09-20 DIAGNOSIS — I77.1 SUBCLAVIAN ARTERIAL STENOSIS (HCC): ICD-10-CM

## 2023-09-20 DIAGNOSIS — R93.89 ABNORMAL CAROTID ULTRASOUND: ICD-10-CM

## 2023-09-20 PROCEDURE — 82565 ASSAY OF CREATININE: CPT

## 2023-09-20 PROCEDURE — 71275 CT ANGIOGRAPHY CHEST: CPT | Performed by: INTERNAL MEDICINE

## 2023-09-21 ENCOUNTER — HOSPITAL ENCOUNTER (OUTPATIENT)
Dept: CT IMAGING | Age: 73
Discharge: HOME | End: 2023-09-21
Attending: INTERNAL MEDICINE
Payer: MEDICARE

## 2023-09-21 DIAGNOSIS — R93.89 ABNORMAL CAROTID ULTRASOUND: ICD-10-CM

## 2023-09-21 LAB
CREAT BLD-MCNC: 0.9 MG/DL
EGFRCR SERPLBLD CKD-EPI 2021: 91 ML/MIN/1.73M2 (ref 60–?)

## 2023-09-21 PROCEDURE — 70498 CT ANGIOGRAPHY NECK: CPT | Performed by: INTERNAL MEDICINE

## 2023-09-22 NOTE — H&P
Please see dictated office note scanned into the EMR. Patient feels he has no major symptoms. He had a calcium scan done which was markedly elevated. He has been referred for angiography. Patient reports no issues since meeting with Dr. Julieta Whitlock. Risks, benefits, and alternatives of cardiac cath/PCI discussed in detail. Risks include but not limited to death, MI, CVA, emergency CABG, transfusion, and surgical repair of vascular complications. Questions answered. Patient agrees to proceed.     Samantha Carbajal MD

## 2023-09-27 NOTE — PAT NURSING NOTE
Per PAT encounter/Sqrrlhart message sent to pt:    Preprocedure Instructions     Visitor Instructions     Adult Patients: [facility] can accompany the patient day of procedure. 2 [facility] may visit [phone] during inpatient stay     PreOp Instructions     You are scheduled for: a Cardiac Procedure     Date of Procedure: 10/02/23 Monday     Diet Instructions: Do not eat or drink anything after midnight     Medications: Take Aspirin 81 mg x 4 tablets the day of your procedure;Medications you are allowed to take can be taken with a sip of water the morning of your procedure     Medications to Stop: Hold herbal supplements and vitamins     Skin Prep: Shower with antibacterial soap using a clean washcloth, prior to procedure     Arrival Time: You will receive a call the Friday before your procedure after 3 pm on what time you should arrive the day of your procedure    Driving After Procedure: If sedation is given, you WILL NOT be able to drive home. You will need a responsible adult  to drive you home. ;Cannot take uber or cab unless approved by physician     Discharge Teaching: Most people can resume normal activities in 2-3 days; Your nurse will give you specific instructions before discharge; Any questions, please call the physician's office      parking is available starting at 6 am or park in the [facility] [address] [facility] [address] at [facility] [place] in at the [place] reception desk. Our  will be there to check you in for your procedure. Please bring your insurance cards and ID with you. Please DO NOT respond to this message, the inbasket is not monitored for messages. For any questions, please call the physician's office.

## 2023-09-29 ENCOUNTER — EKG ENCOUNTER (OUTPATIENT)
Dept: LAB | Age: 73
End: 2023-09-29
Attending: INTERNAL MEDICINE
Payer: MEDICARE

## 2023-09-29 ENCOUNTER — LAB ENCOUNTER (OUTPATIENT)
Dept: LAB | Age: 73
End: 2023-09-29
Attending: INTERNAL MEDICINE
Payer: MEDICARE

## 2023-09-29 DIAGNOSIS — I25.10 CAD (CORONARY ARTERY DISEASE): ICD-10-CM

## 2023-09-29 DIAGNOSIS — Z01.818 PRE-OP TESTING: ICD-10-CM

## 2023-09-29 LAB
ANION GAP SERPL CALC-SCNC: 6 MMOL/L (ref 0–18)
BASOPHILS # BLD AUTO: 0.04 X10(3) UL (ref 0–0.2)
BASOPHILS NFR BLD AUTO: 0.7 %
BUN BLD-MCNC: 13 MG/DL (ref 7–18)
CALCIUM BLD-MCNC: 8.9 MG/DL (ref 8.5–10.1)
CHLORIDE SERPL-SCNC: 107 MMOL/L (ref 98–112)
CO2 SERPL-SCNC: 27 MMOL/L (ref 21–32)
CREAT BLD-MCNC: 0.91 MG/DL
EGFRCR SERPLBLD CKD-EPI 2021: 90 ML/MIN/1.73M2 (ref 60–?)
EOSINOPHIL # BLD AUTO: 0.12 X10(3) UL (ref 0–0.7)
EOSINOPHIL NFR BLD AUTO: 2 %
ERYTHROCYTE [DISTWIDTH] IN BLOOD BY AUTOMATED COUNT: 12.3 %
FASTING STATUS PATIENT QL REPORTED: NO
GLUCOSE BLD-MCNC: 92 MG/DL (ref 70–99)
HCT VFR BLD AUTO: 43 %
HGB BLD-MCNC: 14.6 G/DL
IMM GRANULOCYTES # BLD AUTO: 0.02 X10(3) UL (ref 0–1)
IMM GRANULOCYTES NFR BLD: 0.3 %
LYMPHOCYTES # BLD AUTO: 2.55 X10(3) UL (ref 1–4)
LYMPHOCYTES NFR BLD AUTO: 42.1 %
MCH RBC QN AUTO: 32.7 PG (ref 26–34)
MCHC RBC AUTO-ENTMCNC: 34 G/DL (ref 31–37)
MCV RBC AUTO: 96.4 FL
MONOCYTES # BLD AUTO: 0.41 X10(3) UL (ref 0.1–1)
MONOCYTES NFR BLD AUTO: 6.8 %
NEUTROPHILS # BLD AUTO: 2.91 X10 (3) UL (ref 1.5–7.7)
NEUTROPHILS # BLD AUTO: 2.91 X10(3) UL (ref 1.5–7.7)
NEUTROPHILS NFR BLD AUTO: 48.1 %
OSMOLALITY SERPL CALC.SUM OF ELEC: 290 MOSM/KG (ref 275–295)
PLATELET # BLD AUTO: 210 10(3)UL (ref 150–450)
POTASSIUM SERPL-SCNC: 3.9 MMOL/L (ref 3.5–5.1)
RBC # BLD AUTO: 4.46 X10(6)UL
SODIUM SERPL-SCNC: 140 MMOL/L (ref 136–145)
WBC # BLD AUTO: 6.1 X10(3) UL (ref 4–11)

## 2023-09-29 PROCEDURE — 36415 COLL VENOUS BLD VENIPUNCTURE: CPT

## 2023-09-29 PROCEDURE — 93005 ELECTROCARDIOGRAM TRACING: CPT

## 2023-09-29 PROCEDURE — 85025 COMPLETE CBC W/AUTO DIFF WBC: CPT

## 2023-09-29 PROCEDURE — 80048 BASIC METABOLIC PNL TOTAL CA: CPT

## 2023-09-29 PROCEDURE — 93010 ELECTROCARDIOGRAM REPORT: CPT | Performed by: INTERNAL MEDICINE

## 2023-10-01 LAB
ATRIAL RATE: 63 BPM
P AXIS: 77 DEGREES
P-R INTERVAL: 150 MS
Q-T INTERVAL: 378 MS
QRS DURATION: 104 MS
QTC CALCULATION (BEZET): 386 MS
R AXIS: 75 DEGREES
T AXIS: 72 DEGREES
VENTRICULAR RATE: 63 BPM

## 2023-10-02 ENCOUNTER — HOSPITAL ENCOUNTER (OUTPATIENT)
Dept: INTERVENTIONAL RADIOLOGY/VASCULAR | Facility: HOSPITAL | Age: 73
Discharge: HOME | End: 2023-10-02
Attending: INTERNAL MEDICINE | Admitting: INTERNAL MEDICINE
Payer: MEDICARE

## 2023-10-02 VITALS
HEART RATE: 62 BPM | BODY MASS INDEX: 22.73 KG/M2 | OXYGEN SATURATION: 97 % | TEMPERATURE: 98 F | WEIGHT: 150 LBS | DIASTOLIC BLOOD PRESSURE: 58 MMHG | HEIGHT: 68 IN | SYSTOLIC BLOOD PRESSURE: 119 MMHG | RESPIRATION RATE: 18 BRPM

## 2023-10-02 DIAGNOSIS — R93.1 ELEVATED CORONARY ARTERY CALCIUM SCORE: ICD-10-CM

## 2023-10-02 DIAGNOSIS — Z01.818 PRE-OP TESTING: Primary | ICD-10-CM

## 2023-10-02 DIAGNOSIS — I25.10 CAD (CORONARY ARTERY DISEASE): ICD-10-CM

## 2023-10-02 PROCEDURE — B2151ZZ FLUOROSCOPY OF LEFT HEART USING LOW OSMOLAR CONTRAST: ICD-10-PCS | Performed by: INTERNAL MEDICINE

## 2023-10-02 PROCEDURE — 99153 MOD SED SAME PHYS/QHP EA: CPT | Performed by: INTERNAL MEDICINE

## 2023-10-02 PROCEDURE — B2111ZZ FLUOROSCOPY OF MULTIPLE CORONARY ARTERIES USING LOW OSMOLAR CONTRAST: ICD-10-PCS | Performed by: INTERNAL MEDICINE

## 2023-10-02 PROCEDURE — 99152 MOD SED SAME PHYS/QHP 5/>YRS: CPT | Performed by: INTERNAL MEDICINE

## 2023-10-02 PROCEDURE — 93458 L HRT ARTERY/VENTRICLE ANGIO: CPT | Performed by: INTERNAL MEDICINE

## 2023-10-02 PROCEDURE — 4A023N7 MEASUREMENT OF CARDIAC SAMPLING AND PRESSURE, LEFT HEART, PERCUTANEOUS APPROACH: ICD-10-PCS | Performed by: INTERNAL MEDICINE

## 2023-10-02 RX ORDER — HEPARIN SODIUM 5000 [USP'U]/ML
INJECTION, SOLUTION INTRAVENOUS; SUBCUTANEOUS
Status: DISCONTINUED
Start: 2023-10-02 | End: 2023-10-02 | Stop reason: WASHOUT

## 2023-10-02 RX ORDER — VERAPAMIL HYDROCHLORIDE 2.5 MG/ML
INJECTION, SOLUTION INTRAVENOUS
Status: COMPLETED
Start: 2023-10-02 | End: 2023-10-02

## 2023-10-02 RX ORDER — NITROGLYCERIN 20 MG/100ML
INJECTION INTRAVENOUS
Status: COMPLETED
Start: 2023-10-02 | End: 2023-10-02

## 2023-10-02 RX ORDER — MIDAZOLAM HYDROCHLORIDE 1 MG/ML
INJECTION INTRAMUSCULAR; INTRAVENOUS
Status: COMPLETED
Start: 2023-10-02 | End: 2023-10-02

## 2023-10-02 RX ORDER — HEPARIN SODIUM 5000 [USP'U]/ML
INJECTION, SOLUTION INTRAVENOUS; SUBCUTANEOUS
Status: COMPLETED
Start: 2023-10-02 | End: 2023-10-02

## 2023-10-02 RX ORDER — SODIUM CHLORIDE 9 MG/ML
INJECTION, SOLUTION INTRAVENOUS
Status: COMPLETED | OUTPATIENT
Start: 2023-10-02 | End: 2023-10-02

## 2023-10-02 RX ADMIN — SODIUM CHLORIDE: 9 INJECTION, SOLUTION INTRAVENOUS at 10:15:00

## 2023-10-02 NOTE — PROCEDURES
[address]    [facility]   ATTENDING PHYSICIAN: Kelly Fitch M.D. OPERATING PHYSICIAN: Jax Andrew M.D. PATIENT ACCOUNT#:   [de-identified]    LOCATION:  [facility] 3 [facility] 10  MEDICAL RECORD #:   [id number]       YOB: 1950  ADMISSION DATE:       10/02/2023      OPERATION DATE:  10/02/2023    CARDIAC PROCEDURE TRANSCRIPTION      PREOPERATIVE DIAGNOSIS:    1. Elevated calcium score. 2.   History of hyperlipidemia. POSTOPERATIVE DIAGNOSIS:  Nonobstructive coronary artery disease. PROCEDURE PERFORMED:      CLINICAL HISTORY:  The patient was referred for angiography. The patient is known to have an elevated calcium score. SEDATION:  The patient received conscious sedation. This was monitored by myself and the catheterization lab staff. It started at 1055 and ended at 1131. DESCRIPTION OF PROCEDURE:  After informed consent was obtained, the patient's right radial was prepped and draped in the usual sterile fashion. Using ultrasound, the right radial was identified. Lidocaine 0.5 mL was given over the right radial.  This was entered without difficulty using a micropuncture needle. A micropuncture wire followed, followed by placement of a 5/6 Glidesheath. The patient received 2.5 mg of verapamil and 200 mcg of nitroglycerin in the right radial.  Heparin 5000 units was given. Arturo right, left, and pigtail catheters were used for the diagnostic procedure. At the end of the case, a TR band was placed over the right radial.  There were no apparent acute complications noted, and the patient tolerated the procedure well. CORONARIES:  Injection in the left main coronary artery did not reveal significant disease in the left main. Left main bifurcates into left anterior descending and circumflex coronary arteries. Left anterior descending artery in its proximal section has a stenosis of about 40% at the takeoff of a diagonal branch.   The LAD thereafter is intramyocardial with bridging noted in its midsection. Distally the artery is free of significant disease. The first diagonal branch at the takeoff of the LAD has an eccentric stenosis of about 50%. The circumflex coronary artery has minor luminal irregularities present. The right coronary artery is a dominant vessel. There is approximately a 20% stenosis in its midsection followed by an ectatic segment. The right coronary artery bifurcates into posterior descending and posterolateral arteries that are free of significant disease. VENTRICULOGRAPHY:  Ventriculography reveals normal left ventricular systolic function with an estimated ejection fraction of 60%. Left ventricular end-diastolic pressure was 10 mmHg. No significant gradient was noted upon pullback across the aortic valve. SUMMARY:    1. Nonobstructive disease. 2.   Bridging in the mid LAD. 3.   Normal left ventricular systolic function. RECOMMENDATIONS:  Medical therapy.     Dictated By Karl Fowler M.D.  d: 10/02/2023 11:36:17  t: 10/02/2023 11:53:42  [facility] [id number]  [place]/

## 2023-10-02 NOTE — PROGRESS NOTES
Pt received s/p LHC with Dr. Bia Singh. Right radial arterial access site closed with vasc band with air in cuff. Site CDI, no bleeding or hematoma present. Dr. Bia Singh at bedside and spoke with pt and pt's wife. Vasc band removed per protocol, site remains unchanged. Recovery complete. Discharge instructions given to pt and pt's wife. IV discontinued, pt taken down to [facility] via wheelchair for discharge.

## 2023-11-06 ENCOUNTER — OFFICE VISIT (OUTPATIENT)
Dept: FAMILY MEDICINE CLINIC | Facility: CLINIC | Age: 73
End: 2023-11-06
Payer: MEDICARE

## 2023-11-06 VITALS
TEMPERATURE: 98 F | SYSTOLIC BLOOD PRESSURE: 102 MMHG | DIASTOLIC BLOOD PRESSURE: 60 MMHG | WEIGHT: 135 LBS | HEIGHT: 66.54 IN | BODY MASS INDEX: 21.44 KG/M2 | HEART RATE: 76 BPM | OXYGEN SATURATION: 96 %

## 2023-11-06 DIAGNOSIS — R97.20 BPH WITH ELEVATED PSA: ICD-10-CM

## 2023-11-06 DIAGNOSIS — R42 VERTIGO: ICD-10-CM

## 2023-11-06 DIAGNOSIS — Z98.61 CAD S/P PERCUTANEOUS CORONARY ANGIOPLASTY: ICD-10-CM

## 2023-11-06 DIAGNOSIS — Z12.5 ENCOUNTER FOR SCREENING FOR MALIGNANT NEOPLASM OF PROSTATE: ICD-10-CM

## 2023-11-06 DIAGNOSIS — I25.10 CAD S/P PERCUTANEOUS CORONARY ANGIOPLASTY: ICD-10-CM

## 2023-11-06 DIAGNOSIS — R00.8 SUPRAVENTRICULAR BIGEMINY: ICD-10-CM

## 2023-11-06 DIAGNOSIS — F17.200 SMOKER: ICD-10-CM

## 2023-11-06 DIAGNOSIS — Z00.00 ENCOUNTER FOR ANNUAL HEALTH EXAMINATION: Primary | ICD-10-CM

## 2023-11-06 DIAGNOSIS — J43.2 CENTRILOBULAR EMPHYSEMA (HCC): ICD-10-CM

## 2023-11-06 DIAGNOSIS — Z87.891 PERSONAL HISTORY OF NICOTINE DEPENDENCE: ICD-10-CM

## 2023-11-06 DIAGNOSIS — N40.0 BPH WITH ELEVATED PSA: ICD-10-CM

## 2023-11-06 RX ORDER — FLUTICASONE PROPIONATE 50 MCG
2 SPRAY, SUSPENSION (ML) NASAL DAILY
Qty: 16 ML | Refills: 1 | Status: SHIPPED | OUTPATIENT
Start: 2023-11-06

## 2023-11-06 RX ORDER — AZELASTINE 1 MG/ML
2 SPRAY, METERED NASAL DAILY PRN
Qty: 30 ML | Refills: 2 | Status: SHIPPED | OUTPATIENT
Start: 2023-11-06

## 2024-02-23 ENCOUNTER — LAB ENCOUNTER (OUTPATIENT)
Dept: LAB | Age: 74
End: 2024-02-23
Attending: INTERNAL MEDICINE
Payer: MEDICARE

## 2024-02-23 DIAGNOSIS — I49.3 PVC'S (PREMATURE VENTRICULAR CONTRACTIONS): ICD-10-CM

## 2024-02-23 DIAGNOSIS — R07.9 CHEST PAIN: Primary | ICD-10-CM

## 2024-02-23 DIAGNOSIS — E55.9 VITAMIN D DEFICIENCY: ICD-10-CM

## 2024-02-23 DIAGNOSIS — I25.10 CAD (CORONARY ARTERY DISEASE): ICD-10-CM

## 2024-02-23 DIAGNOSIS — R55 SYNCOPE AND COLLAPSE: ICD-10-CM

## 2024-02-23 DIAGNOSIS — I77.1 STRICTURE OF ARTERY (HCC): ICD-10-CM

## 2024-02-23 DIAGNOSIS — R42 DIZZINESS: ICD-10-CM

## 2024-02-23 DIAGNOSIS — I10 ESSENTIAL HYPERTENSION, MALIGNANT: ICD-10-CM

## 2024-02-23 DIAGNOSIS — R00.2 PALPITATIONS: ICD-10-CM

## 2024-02-23 DIAGNOSIS — E78.00 PURE HYPERCHOLESTEROLEMIA: ICD-10-CM

## 2024-02-23 DIAGNOSIS — Z98.890 PERSONAL HISTORY OF SURGERY TO HEART AND GREAT VESSELS, PRESENTING HAZARDS TO HEALTH: ICD-10-CM

## 2024-02-23 LAB
ALBUMIN SERPL-MCNC: 3.9 G/DL (ref 3.4–5)
ALBUMIN/GLOB SERPL: 1.3 {RATIO} (ref 1–2)
ALP LIVER SERPL-CCNC: 71 U/L
ALT SERPL-CCNC: 19 U/L
ANION GAP SERPL CALC-SCNC: 4 MMOL/L (ref 0–18)
AST SERPL-CCNC: 21 U/L (ref 15–37)
BASOPHILS # BLD AUTO: 0.04 X10(3) UL (ref 0–0.2)
BASOPHILS NFR BLD AUTO: 0.5 %
BILIRUB SERPL-MCNC: 0.6 MG/DL (ref 0.1–2)
BUN BLD-MCNC: 20 MG/DL (ref 9–23)
CALCIUM BLD-MCNC: 8.9 MG/DL (ref 8.5–10.1)
CHLORIDE SERPL-SCNC: 112 MMOL/L (ref 98–112)
CHOLEST SERPL-MCNC: 143 MG/DL (ref ?–200)
CO2 SERPL-SCNC: 25 MMOL/L (ref 21–32)
CREAT BLD-MCNC: 0.96 MG/DL
CRP SERPL HS-MCNC: 0.4 MG/L (ref ?–3)
EGFRCR SERPLBLD CKD-EPI 2021: 85 ML/MIN/1.73M2 (ref 60–?)
EOSINOPHIL # BLD AUTO: 0.16 X10(3) UL (ref 0–0.7)
EOSINOPHIL NFR BLD AUTO: 2.1 %
ERYTHROCYTE [DISTWIDTH] IN BLOOD BY AUTOMATED COUNT: 12.9 %
FASTING PATIENT LIPID ANSWER: YES
FASTING STATUS PATIENT QL REPORTED: YES
GLOBULIN PLAS-MCNC: 2.9 G/DL (ref 2.8–4.4)
GLUCOSE BLD-MCNC: 96 MG/DL (ref 70–99)
HCT VFR BLD AUTO: 43.3 %
HDLC SERPL-MCNC: 60 MG/DL (ref 40–59)
HGB BLD-MCNC: 14.6 G/DL
IMM GRANULOCYTES # BLD AUTO: 0.02 X10(3) UL (ref 0–1)
IMM GRANULOCYTES NFR BLD: 0.3 %
LDLC SERPL CALC-MCNC: 69 MG/DL (ref ?–100)
LYMPHOCYTES # BLD AUTO: 3.68 X10(3) UL (ref 1–4)
LYMPHOCYTES NFR BLD AUTO: 48.7 %
MCH RBC QN AUTO: 32 PG (ref 26–34)
MCHC RBC AUTO-ENTMCNC: 33.7 G/DL (ref 31–37)
MCV RBC AUTO: 95 FL
MONOCYTES # BLD AUTO: 0.5 X10(3) UL (ref 0.1–1)
MONOCYTES NFR BLD AUTO: 6.6 %
NEUTROPHILS # BLD AUTO: 3.15 X10 (3) UL (ref 1.5–7.7)
NEUTROPHILS # BLD AUTO: 3.15 X10(3) UL (ref 1.5–7.7)
NEUTROPHILS NFR BLD AUTO: 41.8 %
NONHDLC SERPL-MCNC: 83 MG/DL (ref ?–130)
OSMOLALITY SERPL CALC.SUM OF ELEC: 294 MOSM/KG (ref 275–295)
PLATELET # BLD AUTO: 236 10(3)UL (ref 150–450)
POTASSIUM SERPL-SCNC: 4.1 MMOL/L (ref 3.5–5.1)
PROT SERPL-MCNC: 6.8 G/DL (ref 6.4–8.2)
RBC # BLD AUTO: 4.56 X10(6)UL
SODIUM SERPL-SCNC: 141 MMOL/L (ref 136–145)
T4 SERPL-MCNC: 8.8 UG/DL
TRIGL SERPL-MCNC: 67 MG/DL (ref 30–149)
TSI SER-ACNC: 1.68 MIU/ML (ref 0.36–3.74)
VIT D+METAB SERPL-MCNC: 50.7 NG/ML (ref 30–100)
VLDLC SERPL CALC-MCNC: 10 MG/DL (ref 0–30)
WBC # BLD AUTO: 7.6 X10(3) UL (ref 4–11)

## 2024-02-23 PROCEDURE — 36415 COLL VENOUS BLD VENIPUNCTURE: CPT

## 2024-02-23 PROCEDURE — 84443 ASSAY THYROID STIM HORMONE: CPT

## 2024-02-23 PROCEDURE — 80061 LIPID PANEL: CPT

## 2024-02-23 PROCEDURE — 80053 COMPREHEN METABOLIC PANEL: CPT

## 2024-02-23 PROCEDURE — 85025 COMPLETE CBC W/AUTO DIFF WBC: CPT

## 2024-02-23 PROCEDURE — 86141 C-REACTIVE PROTEIN HS: CPT

## 2024-02-23 PROCEDURE — 82306 VITAMIN D 25 HYDROXY: CPT

## 2024-02-23 PROCEDURE — 84436 ASSAY OF TOTAL THYROXINE: CPT

## 2024-02-29 ENCOUNTER — LAB ENCOUNTER (OUTPATIENT)
Dept: LAB | Age: 74
End: 2024-02-29
Attending: FAMILY MEDICINE
Payer: MEDICARE

## 2024-02-29 DIAGNOSIS — Z12.5 ENCOUNTER FOR SCREENING FOR MALIGNANT NEOPLASM OF PROSTATE: ICD-10-CM

## 2024-02-29 DIAGNOSIS — I25.10 CAD S/P PERCUTANEOUS CORONARY ANGIOPLASTY: ICD-10-CM

## 2024-02-29 DIAGNOSIS — N40.0 BPH WITH ELEVATED PSA: ICD-10-CM

## 2024-02-29 DIAGNOSIS — Z98.61 CAD S/P PERCUTANEOUS CORONARY ANGIOPLASTY: ICD-10-CM

## 2024-02-29 DIAGNOSIS — R97.20 BPH WITH ELEVATED PSA: ICD-10-CM

## 2024-02-29 LAB
ALBUMIN SERPL-MCNC: 3.7 G/DL (ref 3.4–5)
ALBUMIN/GLOB SERPL: 1.1 {RATIO} (ref 1–2)
ALP LIVER SERPL-CCNC: 72 U/L
ALT SERPL-CCNC: 16 U/L
ANION GAP SERPL CALC-SCNC: 3 MMOL/L (ref 0–18)
AST SERPL-CCNC: 19 U/L (ref 15–37)
BILIRUB SERPL-MCNC: 0.3 MG/DL (ref 0.1–2)
BUN BLD-MCNC: 15 MG/DL (ref 9–23)
CALCIUM BLD-MCNC: 8.9 MG/DL (ref 8.5–10.1)
CHLORIDE SERPL-SCNC: 106 MMOL/L (ref 98–112)
CHOLEST SERPL-MCNC: 138 MG/DL (ref ?–200)
CO2 SERPL-SCNC: 28 MMOL/L (ref 21–32)
COMPLEXED PSA SERPL-MCNC: 3.4 NG/ML (ref ?–4)
CREAT BLD-MCNC: 0.94 MG/DL
EGFRCR SERPLBLD CKD-EPI 2021: 87 ML/MIN/1.73M2 (ref 60–?)
FASTING PATIENT LIPID ANSWER: NO
FASTING STATUS PATIENT QL REPORTED: NO
GLOBULIN PLAS-MCNC: 3.4 G/DL (ref 2.8–4.4)
GLUCOSE BLD-MCNC: 97 MG/DL (ref 70–99)
HDLC SERPL-MCNC: 60 MG/DL (ref 40–59)
LDLC SERPL CALC-MCNC: 64 MG/DL (ref ?–100)
NONHDLC SERPL-MCNC: 78 MG/DL (ref ?–130)
OSMOLALITY SERPL CALC.SUM OF ELEC: 285 MOSM/KG (ref 275–295)
POTASSIUM SERPL-SCNC: 4.3 MMOL/L (ref 3.5–5.1)
PROT SERPL-MCNC: 7.1 G/DL (ref 6.4–8.2)
SODIUM SERPL-SCNC: 137 MMOL/L (ref 136–145)
TRIGL SERPL-MCNC: 69 MG/DL (ref 30–149)
VLDLC SERPL CALC-MCNC: 10 MG/DL (ref 0–30)

## 2024-02-29 PROCEDURE — 36415 COLL VENOUS BLD VENIPUNCTURE: CPT

## 2024-02-29 PROCEDURE — 80053 COMPREHEN METABOLIC PANEL: CPT

## 2024-02-29 PROCEDURE — 80061 LIPID PANEL: CPT

## 2024-05-24 ENCOUNTER — TELEPHONE (OUTPATIENT)
Dept: FAMILY MEDICINE CLINIC | Facility: CLINIC | Age: 74
End: 2024-05-24

## 2024-05-24 RX ORDER — FLUTICASONE PROPIONATE 50 MCG
2 SPRAY, SUSPENSION (ML) NASAL DAILY
Qty: 16 ML | Refills: 1 | Status: SHIPPED | OUTPATIENT
Start: 2024-05-24

## 2024-08-20 ENCOUNTER — LAB ENCOUNTER (OUTPATIENT)
Dept: LAB | Age: 74
End: 2024-08-20
Attending: INTERNAL MEDICINE
Payer: MEDICARE

## 2024-08-20 DIAGNOSIS — R00.2 PALPITATIONS: ICD-10-CM

## 2024-08-20 DIAGNOSIS — R07.9 CHEST PAIN, UNSPECIFIED: ICD-10-CM

## 2024-08-20 DIAGNOSIS — I49.3 PVC'S (PREMATURE VENTRICULAR CONTRACTIONS): ICD-10-CM

## 2024-08-20 DIAGNOSIS — R42 DIZZINESS: ICD-10-CM

## 2024-08-20 DIAGNOSIS — R55 SYNCOPE: ICD-10-CM

## 2024-08-20 DIAGNOSIS — I10 HYPERTENSION: ICD-10-CM

## 2024-08-20 DIAGNOSIS — E78.00 PURE HYPERCHOLESTEROLEMIA: ICD-10-CM

## 2024-08-20 DIAGNOSIS — I25.10 CORONARY ATHEROSCLEROSIS OF NATIVE CORONARY ARTERY: ICD-10-CM

## 2024-08-20 DIAGNOSIS — Z98.890 S/P CORONARY ANGIOGRAM: ICD-10-CM

## 2024-08-20 DIAGNOSIS — E55.9 VITAMIN D DEFICIENCY: ICD-10-CM

## 2024-08-20 LAB
ALBUMIN SERPL-MCNC: 4.4 G/DL (ref 3.2–4.8)
ALBUMIN/GLOB SERPL: 1.8 {RATIO} (ref 1–2)
ALP LIVER SERPL-CCNC: 61 U/L
ALT SERPL-CCNC: 11 U/L
ANION GAP SERPL CALC-SCNC: 4 MMOL/L (ref 0–18)
AST SERPL-CCNC: 20 U/L (ref ?–34)
BASOPHILS # BLD AUTO: 0.05 X10(3) UL (ref 0–0.2)
BASOPHILS NFR BLD AUTO: 0.7 %
BILIRUB SERPL-MCNC: 0.8 MG/DL (ref 0.2–1.1)
BUN BLD-MCNC: 11 MG/DL (ref 9–23)
CALCIUM BLD-MCNC: 9.4 MG/DL (ref 8.7–10.4)
CHLORIDE SERPL-SCNC: 110 MMOL/L (ref 98–112)
CHOLEST SERPL-MCNC: 154 MG/DL (ref ?–200)
CO2 SERPL-SCNC: 26 MMOL/L (ref 21–32)
CREAT BLD-MCNC: 0.86 MG/DL
CRP SERPL HS-MCNC: 0.41 MG/L (ref ?–3)
EGFRCR SERPLBLD CKD-EPI 2021: 92 ML/MIN/1.73M2 (ref 60–?)
EOSINOPHIL # BLD AUTO: 0.14 X10(3) UL (ref 0–0.7)
EOSINOPHIL NFR BLD AUTO: 1.8 %
ERYTHROCYTE [DISTWIDTH] IN BLOOD BY AUTOMATED COUNT: 13 %
FASTING PATIENT LIPID ANSWER: YES
FASTING STATUS PATIENT QL REPORTED: YES
GLOBULIN PLAS-MCNC: 2.5 G/DL (ref 2–3.5)
GLUCOSE BLD-MCNC: 91 MG/DL (ref 70–99)
HCT VFR BLD AUTO: 45.5 %
HDLC SERPL-MCNC: 68 MG/DL (ref 40–59)
HGB BLD-MCNC: 15.1 G/DL
IMM GRANULOCYTES # BLD AUTO: 0.02 X10(3) UL (ref 0–1)
IMM GRANULOCYTES NFR BLD: 0.3 %
LDLC SERPL CALC-MCNC: 74 MG/DL (ref ?–100)
LYMPHOCYTES # BLD AUTO: 3.15 X10(3) UL (ref 1–4)
LYMPHOCYTES NFR BLD AUTO: 41 %
MCH RBC QN AUTO: 32.5 PG (ref 26–34)
MCHC RBC AUTO-ENTMCNC: 33.2 G/DL (ref 31–37)
MCV RBC AUTO: 97.8 FL
MONOCYTES # BLD AUTO: 0.54 X10(3) UL (ref 0.1–1)
MONOCYTES NFR BLD AUTO: 7 %
NEUTROPHILS # BLD AUTO: 3.79 X10 (3) UL (ref 1.5–7.7)
NEUTROPHILS # BLD AUTO: 3.79 X10(3) UL (ref 1.5–7.7)
NEUTROPHILS NFR BLD AUTO: 49.2 %
NONHDLC SERPL-MCNC: 86 MG/DL (ref ?–130)
OSMOLALITY SERPL CALC.SUM OF ELEC: 289 MOSM/KG (ref 275–295)
PLATELET # BLD AUTO: 220 10(3)UL (ref 150–450)
POTASSIUM SERPL-SCNC: 4.1 MMOL/L (ref 3.5–5.1)
PROT SERPL-MCNC: 6.9 G/DL (ref 5.7–8.2)
RBC # BLD AUTO: 4.65 X10(6)UL
SODIUM SERPL-SCNC: 140 MMOL/L (ref 136–145)
T4 SERPL-MCNC: 6.4 UG/DL
TRIGL SERPL-MCNC: 60 MG/DL (ref 30–149)
TSI SER-ACNC: 1.67 MIU/ML (ref 0.55–4.78)
VIT D+METAB SERPL-MCNC: 49.6 NG/ML (ref 30–100)
VLDLC SERPL CALC-MCNC: 9 MG/DL (ref 0–30)
WBC # BLD AUTO: 7.7 X10(3) UL (ref 4–11)

## 2024-08-20 PROCEDURE — 84443 ASSAY THYROID STIM HORMONE: CPT

## 2024-08-20 PROCEDURE — 85025 COMPLETE CBC W/AUTO DIFF WBC: CPT

## 2024-08-20 PROCEDURE — 84436 ASSAY OF TOTAL THYROXINE: CPT

## 2024-08-20 PROCEDURE — 36415 COLL VENOUS BLD VENIPUNCTURE: CPT

## 2024-08-20 PROCEDURE — 80053 COMPREHEN METABOLIC PANEL: CPT

## 2024-08-20 PROCEDURE — 80061 LIPID PANEL: CPT

## 2024-08-20 PROCEDURE — 86141 C-REACTIVE PROTEIN HS: CPT

## 2024-08-20 PROCEDURE — 82306 VITAMIN D 25 HYDROXY: CPT

## 2024-09-20 RX ORDER — AZELASTINE HYDROCHLORIDE 137 UG/1
2 SPRAY, METERED NASAL DAILY PRN
Qty: 30 ML | Refills: 0 | Status: SHIPPED | OUTPATIENT
Start: 2024-09-20

## 2024-10-09 ENCOUNTER — OFFICE VISIT (OUTPATIENT)
Dept: FAMILY MEDICINE CLINIC | Facility: CLINIC | Age: 74
End: 2024-10-09
Payer: MEDICARE

## 2024-10-09 ENCOUNTER — LAB ENCOUNTER (OUTPATIENT)
Dept: LAB | Age: 74
End: 2024-10-09
Attending: FAMILY MEDICINE
Payer: MEDICARE

## 2024-10-09 VITALS
SYSTOLIC BLOOD PRESSURE: 136 MMHG | HEART RATE: 88 BPM | HEIGHT: 66 IN | BODY MASS INDEX: 24.11 KG/M2 | RESPIRATION RATE: 18 BRPM | OXYGEN SATURATION: 99 % | TEMPERATURE: 98 F | WEIGHT: 150 LBS | DIASTOLIC BLOOD PRESSURE: 86 MMHG

## 2024-10-09 DIAGNOSIS — I10 ESSENTIAL HYPERTENSION: Primary | ICD-10-CM

## 2024-10-09 DIAGNOSIS — Z71.6 ENCOUNTER FOR TOBACCO USE CESSATION COUNSELING: ICD-10-CM

## 2024-10-09 DIAGNOSIS — Z12.2 ENCOUNTER FOR SCREENING FOR LUNG CANCER: ICD-10-CM

## 2024-10-09 DIAGNOSIS — Z87.891 HISTORY OF NICOTINE DEPENDENCE: ICD-10-CM

## 2024-10-09 DIAGNOSIS — I10 ESSENTIAL HYPERTENSION: ICD-10-CM

## 2024-10-09 LAB
ANION GAP SERPL CALC-SCNC: 6 MMOL/L (ref 0–18)
BUN BLD-MCNC: 12 MG/DL (ref 9–23)
CALCIUM BLD-MCNC: 9.4 MG/DL (ref 8.7–10.4)
CHLORIDE SERPL-SCNC: 108 MMOL/L (ref 98–112)
CO2 SERPL-SCNC: 27 MMOL/L (ref 21–32)
CREAT BLD-MCNC: 0.86 MG/DL
EGFRCR SERPLBLD CKD-EPI 2021: 92 ML/MIN/1.73M2 (ref 60–?)
FASTING STATUS PATIENT QL REPORTED: YES
GLUCOSE BLD-MCNC: 84 MG/DL (ref 70–99)
OSMOLALITY SERPL CALC.SUM OF ELEC: 291 MOSM/KG (ref 275–295)
POTASSIUM SERPL-SCNC: 4.3 MMOL/L (ref 3.5–5.1)
SODIUM SERPL-SCNC: 141 MMOL/L (ref 136–145)

## 2024-10-09 PROCEDURE — 36415 COLL VENOUS BLD VENIPUNCTURE: CPT

## 2024-10-09 PROCEDURE — 90662 IIV NO PRSV INCREASED AG IM: CPT | Performed by: FAMILY MEDICINE

## 2024-10-09 PROCEDURE — G0008 ADMIN INFLUENZA VIRUS VAC: HCPCS | Performed by: FAMILY MEDICINE

## 2024-10-09 PROCEDURE — 80048 BASIC METABOLIC PNL TOTAL CA: CPT

## 2024-10-09 PROCEDURE — 99214 OFFICE O/P EST MOD 30 MIN: CPT | Performed by: FAMILY MEDICINE

## 2024-10-09 RX ORDER — LOSARTAN POTASSIUM 50 MG/1
50 TABLET ORAL DAILY
COMMUNITY
Start: 2024-08-26

## 2024-10-09 RX ORDER — AMLODIPINE BESYLATE 5 MG/1
5 TABLET ORAL DAILY
Qty: 90 TABLET | Refills: 1 | Status: SHIPPED | OUTPATIENT
Start: 2024-10-09

## 2024-10-09 NOTE — PATIENT INSTRUCTIONS
Continue losartan 50mg daily    Add amlodipine 5mg daily    Call cardiology and ask for followup appt    Call to schedule CT scan of lung    Continue to track BP at home and let me know in 2 wks if not imrpving     Followup in 4 wks as planned

## 2024-10-10 NOTE — PROGRESS NOTES
Magdy Hartman is a 74 year old male here for   Chief Complaint   Patient presents with    Blood Pressure       HPI:       1. Essential hypertension  -BP has been elevated at home  -running 150-170s systolic  -losartan was increased to 50mg by cardiology and he notes that his BP is elevated even more than prior  -feels a little off when BP is high  -has not had lytes checked since med increased    2. Encounter for tobacco use cessation counseling  3. Encounter for screening for lung cancer  4. History of nicotine dependence  -due for repeat screening      HISTORY:  Past Medical History:    Allergic rhinitis    Atherosclerosis of coronary artery    BPH with elevated PSA    CAD S/P percutaneous coronary angioplasty    2007  4 blcokages 40%- no stents POBA    COPD (chronic obstructive pulmonary disease) (HCC)    Dizziness    Dysplastic nevi    Hyperlipidemia    Seborrheic keratoses    Smoker    Supraventricular bigeminy    Wears glasses      Past Surgical History:   Procedure Laterality Date    Angioplasty (coronary)      Colonoscopy  2011    nl    Colonoscopy        Family History   Problem Relation Age of Onset    Cancer Mother         none    Heart Attack Father 50    Heart Disorder Brother         none    Breast Cancer Daughter       Social History:   Social History     Socioeconomic History    Marital status:    Tobacco Use    Smoking status: Every Day     Current packs/day: 0.50     Average packs/day: 0.5 packs/day for 40.0 years (20.0 ttl pk-yrs)     Types: Cigarettes    Smokeless tobacco: Never    Tobacco comments:     7-10 cigarettesa daily   Vaping Use    Vaping status: Never Used   Substance and Sexual Activity    Alcohol use: Yes     Alcohol/week: 10.0 standard drinks of alcohol     Types: 10 Cans of beer per week     Comment: 1-2 beers daily    Drug use: No   Other Topics Concern    Caffeine Concern Yes     Comment: 2 cups of coffee daily    Exercise Yes     Comment: 4 x weekly    Seat Belt Yes     Special Diet No    Stress Concern No    Weight Concern No     Social Drivers of Health     Physical Activity: Insufficiently Active (2/11/2021)    Received from [facility], [facility]    Exercise Vital Sign     Days of Exercise per Week: 3 days     Minutes of Exercise per Session: 30 min    Received from [facility], [facility]    Social Connections    Received from [facility], [facility]    Housing Stability        Medications (Active prior to today's visit):  Current Outpatient Medications   Medication Sig Dispense Refill    losartan 50 MG Oral Tab Take 1 tablet (50 mg total) by mouth daily.      amLODIPine 5 MG Oral Tab Take 1 tablet (5 mg total) by mouth daily. 90 tablet 1    azelastine 137 MCG/SPRAY Nasal Solution SPRAY 2 SPRAYS BY NASAL ROUTE DAILY AS NEEDED FOR RHINITIS. 30 mL 0    fluticasone propionate 50 MCG/ACT Nasal Suspension 2 sprays by Nasal route daily. 16 mL 1    Methylsulfonylmethane (MSM) 1500 MG Oral Tab Take 1 tablet by mouth every other day.      Ascorbic Acid (VITAMIN C OR) Take 1 capsule by mouth every other day.      Cholecalciferol (VITAMIN D) 1000 units Oral Tab Take 1 tablet by mouth every other day.      Atorvastatin Calcium 20 MG Oral Tab Take 1 tablet (20 mg total) by mouth nightly.      Aspirin (ASPIR-81 OR) Take 1 tablet by mouth daily.        Omega-3 Fatty Acids (FISH OIL) 1000 MG Oral Cap Take 1,000 mg by mouth daily.         Allergies:  Allergies[1]      ROS:   See HPI for relevant ROS    --GEN: No other complaints  --HEENT: No other complaints  --RESP: No other complaints  --CV: No other complaints  --GI: No other complaints  --MSK: No other complaints    All other systems reviewed are negative    PHYSICAL EXAM:   /86   Pulse 88   Temp 98 °F (36.7 °C) (Temporal)   Resp 18   Ht 5' 6\" (1.676 m)   Wt 150 lb (68 kg)   SpO2 99%   BMI 24.21 kg/m²     Gen: NAD  HEENT:  NCAT, pupils equal and round  Pulm: CTAB, no wheezing  CV: RRR  Ext: full ROM  Psych: normal affect     ASSESSMENT/PLAN:     1. Essential hypertension  -BP remains elevated despite losartan 50mg  -check- Basic Metabolic Panel (8) [E]; Future  -start amlodipine 5mg for now  -continue to monitor BP at home  -f/u in 4 wks, sooner prn    2. Encounter for tobacco use cessation counseling  3. Encounter for screening for lung cancer  4. History of nicotine dependence  - CT LUNG LD SCREENING(CPT=71271); Future        Chronic Conditions:    No problem-specific Assessment & Plan notes found for this encounter.       Health Maintenance:  Health Maintenance   Topic Date Due    Pneumococcal Vaccine: 65+ Years (1 of 2 - PCV) Never done    Zoster Vaccines (1 of 2) Never done    Lung Cancer Screening  11/29/2023    Tobacco Cessation Counseling  01/01/2024    Annual Physical  11/06/2024    Colorectal Cancer Screening  01/03/2025    PSA  02/28/2026    Influenza Vaccine  Completed    Annual Depression Screening  Completed    Fall Risk Screening (Annual)  Completed               The patient is asked to return in 4 wks.    Orders This Visit:  Orders Placed This Encounter   Procedures    Basic Metabolic Panel (8) [E]    High Dose Fluzone trivalent influenza, 65 yrs+ PFS [08933]       Meds This Visit:  Requested Prescriptions     Signed Prescriptions Disp Refills    amLODIPine 5 MG Oral Tab 90 tablet 1     Sig: Take 1 tablet (5 mg total) by mouth daily.       Imaging & Referrals:  INFLUENZA VAC HIGH DOSE PRSV FREE  CT LUNG LD SCREENING(CPT=71271)     SHANON GAYTAN MD    I spent a total of 30 minutes, more than half of which was spent counseling/coordinating care regarding htn, ct screening       [1] No Known Allergies

## 2024-10-16 ENCOUNTER — TELEPHONE (OUTPATIENT)
Dept: FAMILY MEDICINE CLINIC | Facility: CLINIC | Age: 74
End: 2024-10-16

## 2024-10-16 NOTE — TELEPHONE ENCOUNTER
Spoke with patient's wife. Discussed all recommendations given by Dr Field. Voiced understanding.     Cardiology office had called and patient has appointment on 10/21.

## 2024-10-16 NOTE — TELEPHONE ENCOUNTER
Patient had dizzy spell this am at 0945. He was sitting at table eating breakfast. Patient had checked bp @ 0900 138/63. 0945 dizzy spell, bp 140/63, pulse ox 99%oz, hr 80. Patient hr dropped quickly to 72bpm, denies feeling palpitations at this time. Patient has been trying to get an appointment with cardiology but the office is giving him a hard time because he does not want to see Chris any more. Patient and wife are asking if Dr Field can help facilitate this. They are also concerned because his bp readings have been 140/63, 144/70. Not much change since starting new medication.    Please review and advise.

## 2024-10-16 NOTE — TELEPHONE ENCOUNTER
Patients spouse calling and Dr. Fernando Field started patient on new rx of amLODIPine 5 MG Oral Tab - patient had a dizzy spell and elevated blood pressure this morning and is a bit concerned that he is still having issues discussed in ov the other day. Would like to speak to nurse    Please advise.

## 2024-10-16 NOTE — TELEPHONE ENCOUNTER
Called and spoke to cardiology - they will call patient and set him up with a new cardiologist - with goal of trying to get him a sooner appt    Regarding amlodipine - can continue to give it another week to see if dizziness improving and numbers get better in that time - may take some time - though if not improving by weekend, then stop amlodipine and let me know next week    Recent bloodwork show kidneys and electrolytes are ok - so not likely contributing to his symptoms    Let me know sooner if symptoms worsening and not improving

## 2024-10-22 ENCOUNTER — TELEPHONE (OUTPATIENT)
Dept: FAMILY MEDICINE CLINIC | Facility: CLINIC | Age: 74
End: 2024-10-22

## 2024-10-22 DIAGNOSIS — R26.89 BALANCE DISORDER: ICD-10-CM

## 2024-10-22 DIAGNOSIS — M54.50 CHRONIC BILATERAL LOW BACK PAIN WITHOUT SCIATICA: ICD-10-CM

## 2024-10-22 DIAGNOSIS — G89.29 CHRONIC BILATERAL LOW BACK PAIN WITHOUT SCIATICA: ICD-10-CM

## 2024-10-22 DIAGNOSIS — R29.898 WEAKNESS OF BOTH LOWER EXTREMITIES: Primary | ICD-10-CM

## 2024-10-22 NOTE — TELEPHONE ENCOUNTER
Patients spouse calling and after seeing Cardiologist yesterday, he had mentioned that patient should have mri of spine to evaluate neuropathy.    Please advise.

## 2024-10-22 NOTE — TELEPHONE ENCOUNTER
Spoke with Lizeth. She does not think that they will be able to get Magdy's records as they were at least 15 yrs ago. He did have herniated disk and received shots for that.     She voiced understanding regarding Dr Field's recommendations for Physical Therapy and the xray.

## 2024-10-22 NOTE — TELEPHONE ENCOUNTER
Called and left voicemail for return call.       Patient seen by cardiology. Per office visit note, cardiology suggested an MRI of spine to evaluate neuropathy and to discuss with Dr Field.        Please review and advise.

## 2024-10-22 NOTE — TELEPHONE ENCOUNTER
If he is still having neuropathy and balance issues, and if it may be coming from the back, we should start with PT before imaging.  This may help his symptoms and insurance will likely not cover imaging until we have completed PT first.    I've also ordered a lower back xray to be completed in the meantime.     Also, please have them look to see if they have any records of past back imaging to bring in to upcoming appt.

## 2024-10-22 NOTE — TELEPHONE ENCOUNTER
Spoke with patient's wife. He has appointment with cardiologist in 6 wks and the Dr Mabry would like all tests completed.     MRI and CT scan to be ordered by Dr Field if he agrees with cardiologist.    Please review and advise

## 2024-10-24 ENCOUNTER — HOSPITAL ENCOUNTER (OUTPATIENT)
Dept: GENERAL RADIOLOGY | Age: 74
Discharge: HOME | End: 2024-10-24
Attending: FAMILY MEDICINE
Payer: MEDICARE

## 2024-10-24 DIAGNOSIS — M54.50 CHRONIC BILATERAL LOW BACK PAIN WITHOUT SCIATICA: ICD-10-CM

## 2024-10-24 DIAGNOSIS — G89.29 CHRONIC BILATERAL LOW BACK PAIN WITHOUT SCIATICA: ICD-10-CM

## 2024-10-24 PROCEDURE — 72110 X-RAY EXAM L-2 SPINE 4/>VWS: CPT | Performed by: FAMILY MEDICINE

## 2024-11-05 ENCOUNTER — OFFICE VISIT (OUTPATIENT)
Dept: FAMILY MEDICINE CLINIC | Facility: CLINIC | Age: 74
End: 2024-11-05
Payer: MEDICARE

## 2024-11-05 VITALS
WEIGHT: 150.81 LBS | OXYGEN SATURATION: 96 % | HEART RATE: 71 BPM | SYSTOLIC BLOOD PRESSURE: 122 MMHG | RESPIRATION RATE: 18 BRPM | TEMPERATURE: 98 F | DIASTOLIC BLOOD PRESSURE: 64 MMHG | BODY MASS INDEX: 23.95 KG/M2 | HEIGHT: 66.5 IN

## 2024-11-05 DIAGNOSIS — Z00.00 ENCOUNTER FOR ANNUAL HEALTH EXAMINATION: Primary | ICD-10-CM

## 2024-11-05 DIAGNOSIS — R97.20 BPH WITH ELEVATED PSA: ICD-10-CM

## 2024-11-05 DIAGNOSIS — I25.10 CAD S/P PERCUTANEOUS CORONARY ANGIOPLASTY: ICD-10-CM

## 2024-11-05 DIAGNOSIS — Z98.61 CAD S/P PERCUTANEOUS CORONARY ANGIOPLASTY: ICD-10-CM

## 2024-11-05 DIAGNOSIS — R42 VERTIGO: ICD-10-CM

## 2024-11-05 DIAGNOSIS — Z12.11 ENCOUNTER FOR SCREENING FOR MALIGNANT NEOPLASM OF COLON: ICD-10-CM

## 2024-11-05 DIAGNOSIS — E78.2 MIXED HYPERLIPIDEMIA: ICD-10-CM

## 2024-11-05 DIAGNOSIS — F17.200 SMOKER: ICD-10-CM

## 2024-11-05 DIAGNOSIS — R00.8 SUPRAVENTRICULAR BIGEMINY: ICD-10-CM

## 2024-11-05 DIAGNOSIS — J43.2 CENTRILOBULAR EMPHYSEMA (HCC): ICD-10-CM

## 2024-11-05 DIAGNOSIS — R29.898 WEAKNESS OF BOTH LOWER EXTREMITIES: ICD-10-CM

## 2024-11-05 DIAGNOSIS — I10 ESSENTIAL HYPERTENSION: ICD-10-CM

## 2024-11-05 DIAGNOSIS — N40.0 BPH WITH ELEVATED PSA: ICD-10-CM

## 2024-11-05 DIAGNOSIS — Z87.891 PERSONAL HISTORY OF NICOTINE DEPENDENCE: ICD-10-CM

## 2024-11-05 PROCEDURE — G0439 PPPS, SUBSEQ VISIT: HCPCS | Performed by: FAMILY MEDICINE

## 2024-11-05 PROCEDURE — 99214 OFFICE O/P EST MOD 30 MIN: CPT | Performed by: FAMILY MEDICINE

## 2024-11-05 RX ORDER — FLUTICASONE PROPIONATE 50 MCG
2 SPRAY, SUSPENSION (ML) NASAL DAILY
Qty: 48 ML | Refills: 1 | Status: SHIPPED | OUTPATIENT
Start: 2024-11-05

## 2024-11-05 NOTE — PATIENT INSTRUCTIONS
Call to schedule CT of lung    Call to schedule gastro appt for colonoscopy    Schedule holter and echo with cardiology     Followup with Dr. Ruiz as planned    Followup in 3 months, sooner if needed

## 2024-11-05 NOTE — PROGRESS NOTES
Subjective:   Magdy Hartman is a 74 year old male who presents for a Medicare Subsequent Annual Wellness visit (Pt already had Initial Annual Wellness).     Encounter for annual health examination  -due for wellness    CAD S/P percutaneous coronary angioplasty  Supraventricular bigeminy  -stable, continues to see cardiology  -recently had abnormal heart scan and then underwent CTA chest and angiogram - worst was 50% blockage - will be management medically    HTN  -better with addition of amlodipine    DLD  -stable on statin    Centrilobular emphysema (HCC)  -stable    Smoker  Personal history of nicotine dependence   -working on cutting back  -down to 1 pack every 3 days    BPH with elevated PSA  -no symptoms currently    Vertigo/Lightheaded  Weakness of both lower extremities  -complains of history of bulging disc on right side decades ago  -was treated with DARIAN x 2 then resolved  -has not had symptoms of back pain since  -denies pain or weakness since  -recent xray does 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      Cognitive Assessment:   He had a completely normal cognitive assessment - see flowsheet entries       Functional Ability/Status:   Magdy Hartman has a completely normal functional assessment. See flowsheet for details.        Depression Screening (PHQ-2/PHQ-9): PHQ-2 SCORE: 0  , done 11/5/2024   If you checked off any problems, how difficult have these problems made it for you to do your work, take care of things at home, or get along with other people?: Not difficult at all    Last [place] Suicide Screening on 11/5/2024 was No Risk.     Advanced Directives:   He  does have a Living Will but we do NOT have it on file in Epic.    He does have a POA but we do NOT have it on file in Epic.      Discussed Advance Care Planning with patient (and family/surrogate if present). Standard forms made available to patient in After Visit Summary.      Patient Active Problem List   Diagnosis    Smoker    Dyslipidemia    CAD S/P percutaneous coronary angioplasty    Back pain    COPD (chronic obstructive pulmonary disease) (HCC)    Meralgia paresthetica of right side    Birthmark of skin    Dysplastic nevi    Dizziness    Seborrheic keratoses    Welcome to Medicare preventive visit    BPH with elevated PSA    Supraventricular bigeminy    Special screening for malignant neoplasm of colon    Colon polyps     Allergies:  He has No Known Allergies.    Current Medications:  Outpatient Medications Marked as Taking for the 11/5/24 encounter (Office Visit) with Fernando Field MD   Medication Sig    fluticasone propionate 50 MCG/ACT Nasal Suspension 2 sprays by Nasal route daily.    losartan 50 MG Oral Tab Take 1 tablet (50 mg total) by mouth daily.    amLODIPine 5 MG Oral Tab Take 1 tablet (5 mg total) by mouth daily.    Methylsulfonylmethane (MSM) 1500 MG Oral Tab Take 1 tablet by mouth every other day.    Cholecalciferol (VITAMIN D) 1000 units Oral Tab Take 1 tablet by mouth every other day.    Atorvastatin Calcium 20 MG Oral Tab Take 1 tablet (20 mg total) by mouth nightly.    Aspirin (ASPIR-81 OR) Take 1 tablet by mouth daily.      Omega-3 Fatty Acids (FISH OIL) 1000 MG Oral Cap Take 1,000 mg by mouth daily.       Medical History:  He  has a past medical history of Allergic rhinitis, Atherosclerosis of coronary artery, BPH with elevated PSA (10/06/2016), CAD S/P percutaneous coronary angioplasty (06/20/2013), COPD (chronic obstructive pulmonary disease) (HCC) (08/21/2013), Dizziness (05/23/2014), Dysplastic nevi (02/21/2014), Hyperlipidemia, Seborrheic keratoses (07/14/2015), Smoker  (06/20/2013), Supraventricular bigeminy (10/13/2017), and Wears glasses.  Surgical History:  He  has a past surgical history that includes angioplasty (coronary); colonoscopy (2011); and colonoscopy.   Family History:  His family history includes Breast Cancer in his daughter; Cancer in his mother; Heart Attack (age of onset: 50) in his father; Heart Disorder in his brother.  Social History:  He  reports that he has been smoking cigarettes. He has a 20 pack-year smoking history. He has never used smokeless tobacco. He reports current alcohol use of about 10.0 standard drinks of alcohol per week. He reports that he does not use drugs.    Tobacco:  He currently smokes tobacco.  Social History    Tobacco Use      Smoking status: Some Days        Packs/day: 0.50        Years: 40.00        Pack years: 20        Types: Cigarettes      Smokeless tobacco: Never      Tobacco comments: 7-10 cigarettesa daily     Tobacco Cessation Documentation (Smoking and Smokeless included):  I had an in depth therapy session with Magdy DUONG Minnie about his tobacco use risks and options using the [employer]PSTF's Five A's approach:    Ask: Magdy is using tobacco products.  Assess: We asked about/assessed behavioral health risk and factors affecting choice of behavior change goals/methods.  Specifically I asked about readiness to quit tobacco.  Advise: We gave clear, specific, and personalized behavior change advice, including information about personal health harms and benefits. Specifically, he was told that Quitting tobacco is one of the best things he can do for his health. I strongly encouraged him to quit.      Agree: We collaboratively selected appropriate treatment goals and methods based on the patient’s interest in and willingness to change the behavior.   Assist: We used behavior change techniques (self-help and/or counseling), to aid Magdy in achieving agreed-upon goals by acquiring the skills, confidence, and social/environmental supports  for behavior change, supplemented with adjunctive medical treatments when appropriate. Additionally, national quitting tobacco aides were discussed.   Arrange: Follow up at next visit- see specific follow up below.    Time Counseled:   minutes         CAGE Alcohol Screen:   CAGE screening score of 0 on 10/29/2024, showing low risk of alcohol abuse.      Patient Care Team:  Fernando Field MD as PCP - General (Family Medicine)  Agustin Painter MD (SURGERY, ORTHOPEDIC)  Que Michelle MD (Cardiovascular Diseases)  Vivienne Yousif MD (DERMATOLOGY)    Review of Systems     Negative except above    Objective:   Physical Exam  General Appearance:  Alert, cooperative, no distress, appears stated age   Head:  Normocephalic, without obvious abnormality, atraumatic   Eyes:  PERRL   Ears:  Normal TM's and external ear canals, both ears   Lungs:   Clear to auscultation bilaterally, respirations unlabored   Heart:  Regular rate and rhythm   Extremities: Extremities normal, no cyanosis or edema   Skin: Skin color, texture, turgor normal   Neurologic: No appreciable abnormality, neg rombergs     Bilateral barefoot skin exam is normal, visualized feet and the appearance is normal.  Bilateral monofilament/sensation of both feet is normal.  Pulsation pedal pulse exam of both lower legs/feet is normal as well.      /64 (BP Location: Left arm, Patient Position: Sitting, Cuff Size: adult)   Pulse 71   Temp 97.9 °F (36.6 °C) (Temporal)   Resp 18   Ht 5' 6.5\" (1.689 m)   Wt 150 lb 12.8 oz (68.4 kg)   SpO2 96%   BMI 23.98 kg/m²  Estimated body mass index is 23.98 kg/m² as calculated from the following:    Height as of this encounter: 5' 6.5\" (1.689 m).    Weight as of this encounter: 150 lb 12.8 oz (68.4 kg).    Medicare Hearing Assessment:   Hearing Screening    Screening Method: Finger Rub  Finger Rub Result: Pass             Assessment & Plan:   Magdy Hartman is a 74 year old male who presents for a Medicare  Assessment.     Encounter for annual health examination  -reviewed age appropriate screening, DPOA and living will  -declines immunizations today    CAD S/P percutaneous coronary angioplasty  Supraventricular bigeminy  -stable  -has pending holter and echo  -f//u with cardiology as planned    HTN  -better  -c/w losartan and amlodipine    DLD  -stable, c/w statin    Centrilobular emphysema (HCC)  Smoker  Personal history of nicotine dependence   -stable  -continued to encourage smoking cessation - he is trying to cut back  -will plan to repeat LD lung screening - it is already ordered - he needs to schedule it    BPH with elevated PSA  -stable, will continue to monitor  -PSA normal less than 1 yr ago    Vertigo  Lightheaded  Hx of back issues  Sinus issues  -likely multifactorial  -c/w flonase  -encouraged smoking cessationi  -denies any back pain, or sciatica  -reassured with normal bilateral monofilament testing and romberg negative  -if symptoms worse consider PT and possible MRI depending on symptoms  -f/u here in 3 months, sooner prn      Other orders  -     FLU VACC HIGH DOSE PRSV FREE    The patient indicates understanding of these issues and agrees to the plan.  Reinforced healthy diet, lifestyle, and exercise.      Return in about 3 months (around 2/5/2025).     SHANON GAYTAN MD, 11/16/2022     Supplementary Documentation:   General Health:  In the past six months, have you lost more than 10 pounds without trying?: (Patient-Rptd) 2 - No  Has your appetite been poor?: (Patient-Rptd) No  Type of Diet: (Patient-Rptd) Balanced;Low Salt  How does the patient maintain a good energy level?: (Patient-Rptd) Appropriate Exercise;Daily Walks;Stretching  How would you describe your daily physical activity?: (Patient-Rptd) Moderate  How would you describe your current health state?: (Patient-Rptd) Good  How do you maintain positive mental well-being?: (Patient-Rptd) Social Interaction;Games;Visiting Friends;Visiting  Family  On a scale of 0 to 10, with 0 being no pain and 10 being severe pain, what is your pain level?: (Patient-Rptd) 0 - (None)  At any time do you feel concerned for the safety/well-being of yourself and/or your children, in your home or elsewhere?: (Patient-Rptd) No  Have you had any immunizations at another office such as Influenza, Hepatitis B, Tetanus, or Pneumococcal?: (Patient-Rptd) No        Magdy Hartman's SCREENING SCHEDULE   Tests on this list are recommended by your physician but may not be covered, or covered at this frequency, by your insurer.   Please check with your insurance carrier before scheduling to verify coverage.   PREVENTATIVE SERVICES FREQUENCY &  COVERAGE DETAILS LAST COMPLETION DATE   Diabetes Screening    Fasting Blood Sugar / Glucose    One screening every 12 months if never tested or if previously tested but not diagnosed with pre-diabetes   One screening every 6 months if diagnosed with pre-diabetes Lab Results   Component Value Date    GLU 84 10/09/2024        Cardiovascular Disease Screening    Lipid Panel  Cholesterol  Lipoprotein (HDL)  Triglycerides Covered every 5 years for all Medicare beneficiaries without apparent signs or symptoms of cardiovascular disease Lab Results   Component Value Date    CHOLEST 154 08/20/2024    HDL 68 (H) 08/20/2024    LDL 74 08/20/2024    TRIG 60 08/20/2024         Electrocardiogram (EKG)   Covered if needed at Welcome to Medicare, and non-screening if indicated for medical reasons 10/01/2023      Ultrasound Screening for Abdominal Aortic Aneurysm (AAA) Covered once in a lifetime for one of the following risk factors    Men who are 65-75 years old and have ever smoked    Anyone with a family history -     Colorectal Cancer Screening  Covered for ages 50-85; only need ONE of the following:    Colonoscopy   Covered every 10 years    Covered every 2 years if patient is at high risk or previous colonoscopy was abnormal 01/03/2022    Health  Maintenance   Topic Date Due    Colorectal Cancer Screening  01/03/2025       Flexible Sigmoidoscopy   Covered every 4 years -    Fecal Occult Blood Test Covered annually -   Prostate Cancer Screening    Prostate-Specific Antigen (PSA) Annually Lab Results   Component Value Date    PSA 0.762 07/16/2013     Health Maintenance   Topic Date Due    PSA  02/28/2026      Immunizations    Influenza Covered once per flu season  Please get every year 10/09/2024  No recommendations at this time    Pneumococcal Each vaccine ([id number] & [id number]) covered once after 65 Prevnar 13: -    [id number]: -     Pneumococcal Vaccination(1 of 2 - PCV) Never done    Hepatitis B One screening covered for patients with certain risk factors   -  No recommendations at this time    Tetanus Toxoid Not covered by Medicare Part B unless medically necessary (cut with metal); may be covered with your pharmacy prescription benefits -    Tetanus, Diptheria and Pertusis TD and TDaP Not covered by Medicare Part B -  No recommendations at this time    Zoster Not covered by Medicare Part B; may be covered with your pharmacy  prescription benefits -  Zoster Vaccines(1 of 2) Never done     Annual Monitoring of Persistent Medications (ACE/ARB, digoxin diuretics, anticonvulsants)    Potassium Annually Lab Results   Component Value Date    K 4.3 10/09/2024         Creatinine   Annually Lab Results   Component Value Date    CREATSERUM 0.86 10/09/2024         BUN Annually Lab Results   Component Value Date    BUN 12 10/09/2024       Drug Serum Conc Annually No results found for: \"DIGOXIN\", \"DIG\", \"VALP\"           Chronic Obstructive Pulmonary Disease (COPD)    Spirometry Annually Spirometry date:             Overall 60 minutes was spent on this encounter, 40 mins spent reviewing and providing care coordination for these conditions: htn, lipid, cad, smoking, nodules, back, dizziness, lightheaded  20  minutes was spent reviewing wellness elements and  providing age appropriate screenings.

## 2025-02-24 ENCOUNTER — OFFICE VISIT (OUTPATIENT)
Dept: FAMILY MEDICINE CLINIC | Facility: CLINIC | Age: 75
End: 2025-02-24
Payer: MEDICARE

## 2025-02-24 VITALS
DIASTOLIC BLOOD PRESSURE: 50 MMHG | HEART RATE: 66 BPM | WEIGHT: 157 LBS | SYSTOLIC BLOOD PRESSURE: 126 MMHG | BODY MASS INDEX: 25 KG/M2

## 2025-02-24 DIAGNOSIS — R05.1 ACUTE COUGH: ICD-10-CM

## 2025-02-24 DIAGNOSIS — K40.90 RIGHT INGUINAL HERNIA: Primary | ICD-10-CM

## 2025-02-24 DIAGNOSIS — Z12.11 ENCOUNTER FOR SCREENING FOR MALIGNANT NEOPLASM OF COLON: ICD-10-CM

## 2025-02-24 DIAGNOSIS — Z12.2 ENCOUNTER FOR SCREENING FOR LUNG CANCER: ICD-10-CM

## 2025-02-24 PROCEDURE — 99215 OFFICE O/P EST HI 40 MIN: CPT | Performed by: FAMILY MEDICINE

## 2025-02-24 PROCEDURE — G2211 COMPLEX E/M VISIT ADD ON: HCPCS | Performed by: FAMILY MEDICINE

## 2025-02-24 RX ORDER — METOPROLOL SUCCINATE 25 MG/1
25 TABLET, EXTENDED RELEASE ORAL DAILY
COMMUNITY
Start: 2024-11-27

## 2025-02-24 NOTE — PATIENT INSTRUCTIONS
Call to schedule CT scan of lung and colonoscopy appt    Limit heavy lifting    If swelling or pain worsening, let me know    Consider smoking cessation    Followup in 3 months

## 2025-02-25 NOTE — PROGRESS NOTES
Magdy Hartman is a 74 year old male here for   Chief Complaint   Patient presents with    Swelling     Pt noticed some right groin swelling started 2 weeks ago that seems to correlate with a cough he had.  States now starting to resolve.  Denies any pain.        HPI:       1. Right inguinal hernia  2. Acute cough  -had cold 2 wks ago with increased coughing  -since that time, noticed a bump in right inguinal area  -uncomfortable feeling, but not painful  -cough has been improving and swelling also improving    3. Encounter for screening for lung cancer  -still due for CT screening    4. Encounter for screening for malignant neoplasm of colon  -still due for colonoscopy        HISTORY:  Past Medical History:    Allergic rhinitis    Atherosclerosis of coronary artery    BPH with elevated PSA    CAD S/P percutaneous coronary angioplasty    2007  4 blcokages 40%- no stents POBA    COPD (chronic obstructive pulmonary disease) (HCC)    Dizziness    Dysplastic nevi    Hyperlipidemia    Seborrheic keratoses    Smoker    Supraventricular bigeminy    Wears glasses      Past Surgical History:   Procedure Laterality Date    Angioplasty (coronary)      Colonoscopy  2011    nl    Colonoscopy        Family History   Problem Relation Age of Onset    Cancer Mother         none    Heart Attack Father 50    Heart Disorder Brother         none    Breast Cancer Daughter       Social History:   Social History     Socioeconomic History    Marital status:    Tobacco Use    Smoking status: Every Day     Current packs/day: 0.50     Average packs/day: 0.5 packs/day for 40.0 years (20.0 ttl pk-yrs)     Types: Cigarettes    Smokeless tobacco: Never    Tobacco comments:     7-10 cigarettesa daily   Vaping Use    Vaping status: Never Used   Substance and Sexual Activity    Alcohol use: Yes     Alcohol/week: 10.0 standard drinks of alcohol     Types: 10 Cans of beer per week     Comment: 1-2 beers daily    Drug use: No   Other Topics  Concern    Caffeine Concern Yes     Comment: 2 cups of coffee daily    Exercise Yes     Comment: 4 x weekly    Seat Belt Yes    Special Diet No    Stress Concern No    Weight Concern No     Social Drivers of Health      [facility]    Housing Stability        Medications (Active prior to today's visit):  Current Outpatient Medications   Medication Sig Dispense Refill    metoprolol succinate ER 25 MG Oral Tablet 24 Hr Take 1 tablet (25 mg total) by mouth daily.      fluticasone propionate 50 MCG/ACT Nasal Suspension 2 sprays by Nasal route daily. 48 mL 1    losartan 50 MG Oral Tab Take 1 tablet (50 mg total) by mouth daily.      amLODIPine 5 MG Oral Tab Take 1 tablet (5 mg total) by mouth daily. 90 tablet 1    azelastine 137 MCG/SPRAY Nasal Solution SPRAY 2 SPRAYS BY NASAL ROUTE DAILY AS NEEDED FOR RHINITIS. 30 mL 0    Methylsulfonylmethane (MSM) 1500 MG Oral Tab Take 1 tablet by mouth every other day.      Ascorbic Acid (VITAMIN C OR) Take 1 capsule by mouth every other day.      Cholecalciferol (VITAMIN D) 1000 units Oral Tab Take 1 tablet by mouth every other day.      Atorvastatin Calcium 20 MG Oral Tab Take 1 tablet (20 mg total) by mouth nightly.      Aspirin (ASPIR-81 OR) Take 1 tablet by mouth daily.        Omega-3 Fatty Acids (FISH OIL) 1000 MG Oral Cap Take 1,000 mg by mouth daily.         Allergies:  Allergies[1]      ROS:   See HPI for relevant ROS    --GEN: No other complaints  --HEENT: No other complaints  --RESP: No other complaints  --CV: No other complaints  --GI: No other complaints  --MSK: No other complaints    All other systems reviewed are negative    PHYSICAL EXAM:   /50   Pulse 66   Wt 157 lb (71.2 kg)   BMI 24.96 kg/m²     Gen: NAD  HEENT: NCAT, pupils equal and round  Pulm: CTAB, no wheezing  CV: RRR  Ext: full ROM; right inguinal fullness worse with valsalva, reproducible, non-tender  Psych: normal affect      ASSESSMENT/PLAN:     1. Right inguinal hernia  2. Acute cough  -cough improving  -suspect mild hernia  -not painful, reducible  -counseled on expectant management  -if worsening, he will let us know and we will refer to gen surg  -if severe pain that is unrelenting, will go to ER    3. Encounter for screening for lung cancer  -reminded about CT lung screening  -counseled on tobacco cessation - he will think about it    4. Encounter for screening for malignant neoplasm of colon  -reminded to schedule colonsocopy        Chronic Conditions:    No problem-specific Assessment & Plan notes found for this encounter.       Health Maintenance:  Health Maintenance   Topic Date Due    Pneumococcal Vaccine: 50+ Years (1 of 2 - PCV) Never done    Zoster Vaccines (1 of 2) Never done    Lung Cancer Screening  11/29/2023    Tobacco Cessation Counseling  Never done    Colorectal Cancer Screening  01/03/2025    Annual Physical  11/05/2025    PSA  02/28/2026    Influenza Vaccine  Completed    Annual Depression Screening  Completed    Fall Risk Screening (Annual)  Completed    Meningococcal B Vaccine  Aged Out               The patient is asked to return in 3 months, sooner prn.    Orders This Visit:  No orders of the defined types were placed in this encounter.      Meds This Visit:  Requested Prescriptions      No prescriptions requested or ordered in this encounter       Imaging & Referrals:  None     SHANON GAYTAN MD    I spent a total of 40 minutes, more than half of which was spent counseling/coordinating care regarding hernia       [1]   Allergies  Allergen Reactions    Levonorgestrel-Ethinyl Estrad Coughing     Taking OTC meds for relief - benedryl

## 2025-03-06 ENCOUNTER — OFFICE VISIT (OUTPATIENT)
Dept: FAMILY MEDICINE CLINIC | Facility: CLINIC | Age: 75
End: 2025-03-06
Payer: MEDICARE

## 2025-03-06 ENCOUNTER — TELEPHONE (OUTPATIENT)
Dept: FAMILY MEDICINE CLINIC | Facility: CLINIC | Age: 75
End: 2025-03-06

## 2025-03-06 VITALS
SYSTOLIC BLOOD PRESSURE: 122 MMHG | RESPIRATION RATE: 16 BRPM | OXYGEN SATURATION: 97 % | HEART RATE: 62 BPM | DIASTOLIC BLOOD PRESSURE: 60 MMHG | TEMPERATURE: 97 F

## 2025-03-06 DIAGNOSIS — H61.21 RIGHT EAR IMPACTED CERUMEN: Primary | ICD-10-CM

## 2025-03-06 PROCEDURE — 69210 REMOVE IMPACTED EAR WAX UNI: CPT | Performed by: NURSE PRACTITIONER

## 2025-03-06 RX ORDER — MECLIZINE HYDROCHLORIDE 25 MG/1
25 TABLET ORAL 3 TIMES DAILY PRN
Qty: 30 TABLET | Refills: 1 | Status: SHIPPED | OUTPATIENT
Start: 2025-03-06

## 2025-03-06 NOTE — TELEPHONE ENCOUNTER
Spoke with patient. Patient states that the dizziness and lightheaded started this am. Patient was shaving and he thought he was going to pass out. Patient did take blood pressure 138/60s. Patient unsure of pulse. Patient had taken all his am pills and had breakfast. Patient stated that he is not dizzy or lightheaded during this conversation.  Advised patient to call cardiologist to inform them of his episode as well. Patient agreeable.     Patient ears feels like he is wearing ear plugs. Patient has been using the Flonase and tried ear rinse with some relief. He stated that it has been going on for a few days, but he feels better today. Patient informed that he would need an appointment for an ear washing. Patient voiced understanding.   Please advise

## 2025-03-06 NOTE — TELEPHONE ENCOUNTER
Sent in meclizine - continue flonase - will keep an eye on my schedule and will get him in next week to check his ears    Continue to track BP and pulse with each episode

## 2025-03-06 NOTE — TELEPHONE ENCOUNTER
Called and spoke to patient. Advised to continue to track blood pressure and pulse when getting dizzy. Advised we will get him in next week for his ears. Also advised that meclizine was sent in and he should continue Flonase

## 2025-03-06 NOTE — TELEPHONE ENCOUNTER
Spouse would like to know if patient is able to be seen for ear cleaning as it was not done at last office visit. Patient having dizziness and light headed.   Please advise

## 2025-03-06 NOTE — PROGRESS NOTES
CHIEF COMPLAINT:     Chief Complaint   Patient presents with    Ear Problem     Bilat ear clogged, muffled hearing   Denies pain       HPI:   Magdy Hartman is a 74 year old male who presents to clinic today with complaints of bilateral ear clogged and muffled hearing. Denies ear pain. Has had for a few days. Patient reports history of cerumen impaction and normally gets ear canals cleaned with PCP.   Associated symptoms: muffled hearing.   Here for cerumen removal.   Patient reports use of Q-tips to clean the ears.  Home treatment includes none.     Current Outpatient Medications   Medication Sig Dispense Refill    meclizine 25 MG Oral Tab Take 1 tablet (25 mg total) by mouth 3 (three) times daily as needed. 30 tablet 1    metoprolol succinate ER 25 MG Oral Tablet 24 Hr Take 1 tablet (25 mg total) by mouth daily.      fluticasone propionate 50 MCG/ACT Nasal Suspension 2 sprays by Nasal route daily. 48 mL 1    losartan 50 MG Oral Tab Take 1 tablet (50 mg total) by mouth daily.      amLODIPine 5 MG Oral Tab Take 1 tablet (5 mg total) by mouth daily. 90 tablet 1    azelastine 137 MCG/SPRAY Nasal Solution SPRAY 2 SPRAYS BY NASAL ROUTE DAILY AS NEEDED FOR RHINITIS. 30 mL 0    Methylsulfonylmethane (MSM) 1500 MG Oral Tab Take 1 tablet by mouth every other day.      Ascorbic Acid (VITAMIN C OR) Take 1 capsule by mouth every other day.      Cholecalciferol (VITAMIN D) 1000 units Oral Tab Take 1 tablet by mouth every other day.      Atorvastatin Calcium 20 MG Oral Tab Take 1 tablet (20 mg total) by mouth nightly.      Aspirin (ASPIR-81 OR) Take 1 tablet by mouth daily.        Omega-3 Fatty Acids (FISH OIL) 1000 MG Oral Cap Take 1,000 mg by mouth daily.        Past Medical History:    Allergic rhinitis    Atherosclerosis of coronary artery    BPH with elevated PSA    CAD S/P percutaneous coronary angioplasty    2007  4 blcokages 40%- no stents POBA    COPD (chronic obstructive pulmonary disease) (HCC)    Dizziness     Dysplastic nevi    Hyperlipidemia    Seborrheic keratoses    Smoker    Supraventricular bigeminy    Wears glasses      Social History:  Social History     Socioeconomic History    Marital status:    Tobacco Use    Smoking status: Every Day     Current packs/day: 0.50     Average packs/day: 0.5 packs/day for 40.0 years (20.0 ttl pk-yrs)     Types: Cigarettes    Smokeless tobacco: Never    Tobacco comments:     7-10 cigarettesa daily   Vaping Use    Vaping status: Never Used   Substance and Sexual Activity    Alcohol use: Yes     Alcohol/week: 10.0 standard drinks of alcohol     Types: 10 Cans of beer per week     Comment: 1-2 beers daily    Drug use: No   Other Topics Concern    Caffeine Concern Yes     Comment: 2 cups of coffee daily    Exercise Yes     Comment: 4 x weekly    Seat Belt Yes    Special Diet No    Stress Concern No    Weight Concern No     Social Drivers of Health      [facility]    Housing Stability        REVIEW OF SYSTEMS:   GENERAL: Feeling well otherwise.    SKIN: no unusual skin lesions or rashes  HEENT: See HPI  LUNGS: No cough, shortness of breath, or wheezing.  CARDIOVASCULAR: No chest pain, palpitations  GI: No N/V/C/D.  NEURO: denies headaches     EXAM:   /60   Pulse 62   Temp 97.3 °F (36.3 °C) (Tympanic)   Resp 16   SpO2 97%   GENERAL: well developed, well nourished,in no apparent distress  SKIN: no rashes,no suspicious lesions  HEAD: atraumatic, normocephalic  EYES: conjunctiva clear, EOM intact  EARS: Tragus non tender on palpation bilaterally. External auditory canals healthy. Right ear cerumen impaction. Post removal  Right TM: gray, + bulging, no retraction, no effusion, bony landmarks intact.   Left TM: gray, + bulging, no retraction,no effusion, bony landmarks intact.  NOSE: nostrils patent, no nasal discharge, nasal mucosa pink and noninflamed  THROAT: oral mucosa pink, moist. Posterior pharynx is not  erythematous or injected. No exudates.  NECK: supple, non-tender  LUNGS: clear to auscultation bilaterally, no wheezes or rhonchi. No diminished breath sounds. Breathing is non labored.  CARDIO: RRR without murmur  EXTREMITIES: no cyanosis, clubbing or edema  LYMPH: no auricular lymphadenopathy; no cervical lymphadenopathy.      Cerumen Removal Procedure  Patient gave verbal consent.  Risks and Benefits of removal were discussed with the patient, who agreed to proceed with procedure.  Location: right ear  Indication TM not visible, local itching, fullness, decreased hearaing.  Prep Hydrogen Peroxide  Otoscope visualization of cerumen and the curette was used to remove the wax  Patient Status: Tolerated well.  No complications      ASSESSMENT AND PLAN:   Magdy Hartman is a 74 year old male who presents with:    ASSESSMENT:  Encounter Diagnosis   Name Primary?    Right ear impacted cerumen Yes     Resolved.   Debrox or peroxide prn.     PLAN: Meds and instructions as listed below.    Comfort measures as described in Patient Instructions.    Risks, benefits, and side effects of medication explained and discussed.  Follow up with PCP if s/sx worsen, do not improve in 3 days, or if fever of 100.4 or greater persists for 72 hrs.    Requested Prescriptions      No prescriptions requested or ordered in this encounter       Patient voiced understanding and is in agreement with treatment plan

## 2025-03-24 RX ORDER — AMLODIPINE BESYLATE 5 MG/1
5 TABLET ORAL DAILY
Qty: 90 TABLET | Refills: 1 | Status: SHIPPED | OUTPATIENT
Start: 2025-03-24

## 2025-05-22 ENCOUNTER — OFFICE VISIT (OUTPATIENT)
Dept: FAMILY MEDICINE CLINIC | Facility: CLINIC | Age: 75
End: 2025-05-22
Payer: MEDICARE

## 2025-05-22 VITALS
HEIGHT: 66.5 IN | RESPIRATION RATE: 18 BRPM | OXYGEN SATURATION: 97 % | DIASTOLIC BLOOD PRESSURE: 66 MMHG | TEMPERATURE: 98 F | SYSTOLIC BLOOD PRESSURE: 128 MMHG | HEART RATE: 52 BPM | BODY MASS INDEX: 24.65 KG/M2 | WEIGHT: 155.19 LBS

## 2025-05-22 DIAGNOSIS — I25.10 CAD S/P PERCUTANEOUS CORONARY ANGIOPLASTY: ICD-10-CM

## 2025-05-22 DIAGNOSIS — E78.2 MIXED HYPERLIPIDEMIA: ICD-10-CM

## 2025-05-22 DIAGNOSIS — R29.898 WEAKNESS OF BOTH LOWER EXTREMITIES: ICD-10-CM

## 2025-05-22 DIAGNOSIS — R93.7 ABNORMAL MRI, LUMBAR SPINE: ICD-10-CM

## 2025-05-22 DIAGNOSIS — Z98.61 CAD S/P PERCUTANEOUS CORONARY ANGIOPLASTY: ICD-10-CM

## 2025-05-22 DIAGNOSIS — K40.90 RIGHT INGUINAL HERNIA: Primary | ICD-10-CM

## 2025-05-22 DIAGNOSIS — I10 ESSENTIAL HYPERTENSION: ICD-10-CM

## 2025-05-22 DIAGNOSIS — M51.362 DEGENERATION OF INTERVERTEBRAL DISC OF LUMBAR REGION WITH DISCOGENIC BACK PAIN AND LOWER EXTREMITY PAIN: ICD-10-CM

## 2025-05-22 DIAGNOSIS — R00.8 SUPRAVENTRICULAR BIGEMINY: ICD-10-CM

## 2025-05-22 PROCEDURE — G2211 COMPLEX E/M VISIT ADD ON: HCPCS | Performed by: FAMILY MEDICINE

## 2025-05-22 PROCEDURE — 99215 OFFICE O/P EST HI 40 MIN: CPT | Performed by: FAMILY MEDICINE

## 2025-05-22 RX ORDER — FLUTICASONE PROPIONATE 50 MCG
2 SPRAY, SUSPENSION (ML) NASAL DAILY
Qty: 48 ML | Refills: 1 | Status: SHIPPED | OUTPATIENT
Start: 2025-05-22

## 2025-05-22 NOTE — PATIENT INSTRUCTIONS
Call to schedule with surgeon for hernia    Call to schedule MRI of back    Call to schedule CT of lung and colonoscopy soon    Followup in 3 months, sooner if needed

## 2025-05-22 NOTE — PROGRESS NOTES
Magdy Hartman is a 75 year old male here for   Chief Complaint   Patient presents with    Follow - Up     Follow up for hernia patient states is getting bigger        HPI:       1. Right inguinal hernia  -worse  -getting bigger  -interested in seeing surgeon    2. Degeneration of intervertebral disc of lumbar region with discogenic back pain and lower extremity pain  3. Weakness of both lower extremities  4. Abnormal MRI, lumbar spine  -continues to have weakness that comes and goes  -has long standing hx of back issues  -continues to have episodes of dizziness and losing balance  -lumbar xray shows significant arthritis in lumbar spine  -has tried conservative treatment at home with heat/ice, nsaids  -has tried home exercises without significant improvement    5. CAD S/P percutaneous coronary angioplasty  6. Supraventricular bigeminy  7. Essential hypertension  8. Mixed hyperlipidemia  -stable  -denies syncopal episodes - cardiology does not think this is coming from heart  -tolerating meds        HISTORY:  Past Medical History[1]   Past Surgical History[2]   Family History[3]   Social History: Short Social Hx on File[4]     Medications (Active prior to today's visit):  Current Medications[5]    Allergies:  Allergies[6]      ROS:   See HPI for relevant ROS    --GEN: No other complaints  --HEENT: No other complaints  --RESP: No other complaints  --CV: No other complaints  --GI: No other complaints  --MSK: No other complaints    All other systems reviewed are negative    PHYSICAL EXAM:   /66 (BP Location: Left arm, Patient Position: Sitting, Cuff Size: adult)   Pulse 52   Temp 97.9 °F (36.6 °C) (Temporal)   Resp 18   Ht 5' 6.5\" (1.689 m)   Wt 155 lb 3.2 oz (70.4 kg)   SpO2 97%   BMI 24.67 kg/m²     Gen: NAD  HEENT: NCAT, pupils equal and round  Pulm: CTAB, no wheezing  CV: RRR  Ext: full ROM  Psych: normal affect     ASSESSMENT/PLAN:     1. Right inguinal hernia  -worse  -referred to - Surgery Referral  - In Network    2. Degeneration of intervertebral disc of lumbar region with discogenic back pain and lower extremity pain  3. Weakness of both lower extremities  4. Abnormal MRI, lumbar spine  -worse  -failed conservative treatment at home for > 6 wks  -failed home exercise program > 6 wks  -abnormal xray  -hx of abnormal MRI  -now with worse balance and weakness  -needs MRI - ordered    - MRI SPINE LUMBAR (CPT=72148); Future    5. CAD S/P percutaneous coronary angioplasty  6. Supraventricular bigeminy  7. Essential hypertension  8. Mixed hyperlipidemia  -stable, f/u with cardiology as planned     Reminded to schedule colonoscopy and CT lung    Chronic Conditions:    No problem-specific Assessment & Plan notes found for this encounter.       Health Maintenance:  Health Maintenance   Topic Date Due    Pneumococcal Vaccine: 50+ Years (1 of 2 - PCV) Never done    Zoster Vaccines (1 of 2) Never done    Lung Cancer Screening  2023    Tobacco Cessation Counseling  Never done    Colorectal Cancer Screening  2025    Annual Physical  2025    PSA  2026    Influenza Vaccine  Completed    Annual Depression Screening  Completed    Fall Risk Screening (Annual)  Completed    Meningococcal B Vaccine  Aged Out               The patient is asked to return in 2-3 months, sooner prn.    Orders This Visit:  No orders of the defined types were placed in this encounter.      Meds This Visit:  Requested Prescriptions     Signed Prescriptions Disp Refills    fluticasone propionate 50 MCG/ACT Nasal Suspension 48 mL 1     Si sprays by Nasal route daily.       Imaging & Referrals:  SURGERY - INTERNAL  MRI SPINE LUMBAR (CPT=72148)     SHANON GAYTAN MD    I spent a total of 40 minutes, more than half of which was spent counseling/coordinating care regarding back, hernia, cad       [1]   Past Medical History:   Allergic rhinitis    Atherosclerosis of coronary artery    BPH with elevated PSA    CAD S/P percutaneous  coronary angioplasty    2007  4 blcokages 40%- no stents POBA    COPD (chronic obstructive pulmonary disease) (HCC)    Dizziness    Dysplastic nevi    Hyperlipidemia    Seborrheic keratoses    Smoker    Supraventricular bigeminy    Wears glasses   [2]   Past Surgical History:  Procedure Laterality Date    Angioplasty (coronary)      Colonoscopy  2011    nl    Colonoscopy     [3]   Family History  Problem Relation Age of Onset    Cancer Mother         none    Heart Attack Father 50    Heart Disorder Brother         none    Breast Cancer Daughter    [4]   Social History  Socioeconomic History    Marital status:    Tobacco Use    Smoking status: Some Days     Current packs/day: 0.50     Average packs/day: 0.5 packs/day for 40.0 years (20.0 ttl pk-yrs)     Types: Cigarettes    Smokeless tobacco: Never    Tobacco comments:     7-10 cigarettesa daily   Vaping Use    Vaping status: Never Used   Substance and Sexual Activity    Alcohol use: Yes     Alcohol/week: 10.0 standard drinks of alcohol     Types: 10 Cans of beer per week     Comment: 1-2 beers daily    Drug use: No   Other Topics Concern    Caffeine Concern Yes     Comment: 2 cups of coffee daily    Exercise Yes     Comment: 4 x weekly    Seat Belt Yes    Special Diet No    Stress Concern No    Weight Concern No     Social Drivers of Health      Received from [facility]    Housing Stability   [5]   Current Outpatient Medications   Medication Sig Dispense Refill    fluticasone propionate 50 MCG/ACT Nasal Suspension 2 sprays by Nasal route daily. 48 mL 1    AMLODIPINE 5 MG Oral Tab TAKE 1 TABLET (5 MG TOTAL) BY MOUTH DAILY. 90 tablet 1    meclizine 25 MG Oral Tab Take 1 tablet (25 mg total) by mouth 3 (three) times daily as needed. 30 tablet 1    metoprolol succinate ER 25 MG Oral Tablet 24 Hr Take 1 tablet (25 mg total) by mouth daily.      losartan 50 MG Oral Tab Take 1 tablet (50 mg total) by mouth daily.      azelastine 137 MCG/SPRAY  Nasal Solution SPRAY 2 SPRAYS BY NASAL ROUTE DAILY AS NEEDED FOR RHINITIS. 30 mL 0    Ascorbic Acid (VITAMIN C OR) Take 1 capsule by mouth every other day.      Cholecalciferol (VITAMIN D) 1000 units Oral Tab Take 1 tablet by mouth every other day.      Atorvastatin Calcium 20 MG Oral Tab Take 1 tablet (20 mg total) by mouth nightly.      Aspirin (ASPIR-81 OR) Take 1 tablet by mouth daily.        Omega-3 Fatty Acids (FISH OIL) 1000 MG Oral Cap Take 1,000 mg by mouth daily.      Methylsulfonylmethane (MSM) 1500 MG Oral Tab Take 1 tablet by mouth every other day.     [6]   Allergies  Allergen Reactions    Levonorgestrel-Ethinyl Estrad Coughing     Taking OTC meds for relief - benedryl

## 2025-06-06 ENCOUNTER — HOSPITAL ENCOUNTER (OUTPATIENT)
Dept: CT IMAGING | Age: 75
Discharge: HOME | End: 2025-06-06
Attending: FAMILY MEDICINE
Payer: MEDICARE

## 2025-06-06 DIAGNOSIS — Z12.2 ENCOUNTER FOR SCREENING FOR LUNG CANCER: ICD-10-CM

## 2025-06-06 DIAGNOSIS — Z87.891 HISTORY OF NICOTINE DEPENDENCE: ICD-10-CM

## 2025-06-06 PROCEDURE — 71271 CT THORAX LUNG CANCER SCR C-: CPT | Performed by: FAMILY MEDICINE

## 2025-06-23 ENCOUNTER — HOSPITAL ENCOUNTER (OUTPATIENT)
Dept: MRI IMAGING | Age: 75
Discharge: HOME | End: 2025-06-23
Attending: FAMILY MEDICINE
Payer: MEDICARE

## 2025-06-23 DIAGNOSIS — R29.898 WEAKNESS OF BOTH LOWER EXTREMITIES: ICD-10-CM

## 2025-06-23 DIAGNOSIS — M51.362 DEGENERATION OF INTERVERTEBRAL DISC OF LUMBAR REGION WITH DISCOGENIC BACK PAIN AND LOWER EXTREMITY PAIN: ICD-10-CM

## 2025-06-23 PROCEDURE — 72148 MRI LUMBAR SPINE W/O DYE: CPT | Performed by: FAMILY MEDICINE

## 2025-06-25 ENCOUNTER — OFFICE VISIT (OUTPATIENT)
Dept: SURGERY | Facility: CLINIC | Age: 75
End: 2025-06-25
Payer: MEDICARE

## 2025-06-25 VITALS
RESPIRATION RATE: 16 BRPM | SYSTOLIC BLOOD PRESSURE: 115 MMHG | DIASTOLIC BLOOD PRESSURE: 55 MMHG | HEART RATE: 64 BPM | OXYGEN SATURATION: 96 % | TEMPERATURE: 98 F

## 2025-06-25 DIAGNOSIS — K40.90 RIGHT INGUINAL HERNIA: Primary | ICD-10-CM

## 2025-06-25 PROCEDURE — 99204 OFFICE O/P NEW MOD 45 MIN: CPT | Performed by: SURGERY

## 2025-06-25 NOTE — H&P
New Patient Visit Note       Active Problems      1. Right inguinal hernia        Chief Complaint   Chief Complaint   Patient presents with    New Patient     NP- Right Inguinal Hernia- denies recent imaging, reports bulging in right groin        History of Present Illness     History of Present Illness  Magdy Hartman is a 75 year old male who presents for surgical consultation for a right-sided hernia.    Right inguinal hernia  - Right-sided hernia becomes noticeable during physical activities such as golfing or yard work  - Hernia bulges out with exertion and typically reduces overnight when lying down and relaxed  - No associated pain  - No difficulty with lifting or other activities  - No prior surgeries in the affected area    Hypertension  - Takes antihypertensive medication  - Takes 81 mg baby aspirin daily    Physical activity  - Maintains an active lifestyle  - Participates in two golf leagues      I discussed the risk, benefits, alternatives of surgery.  I discussed the anticipated postoperative recovery including dietary, activity, exercise, and lifestyle recommendations.  The patient's questions regarding surgical procedure were answered and the patient voiced understanding of the care plan.    Allergies  Magdy is allergic to levonorgestrel-ethinyl estrad.    Past Medical / Surgical / Social / Family History    The past medical and past surgical history have been reviewed by me today.    Past Medical History[1]  Past Surgical History[2]    The family history and social history have been reviewed by me today.    Family History[3]  Social Hx on file[4]   Medications - Current[5]      Review of Systems  The Review of Systems has been reviewed by me during today.  Review of Systems   Constitutional:  Negative for chills, diaphoresis, fatigue, fever and unexpected weight change.   HENT:  Negative for hearing loss, nosebleeds, sore throat and trouble swallowing.    Respiratory:  Negative for apnea, cough,  shortness of breath and wheezing.    Cardiovascular:  Negative for chest pain, palpitations and leg swelling.   Gastrointestinal:  Negative for abdominal distention, abdominal pain, anal bleeding, blood in stool, constipation, diarrhea, nausea and vomiting.   Genitourinary:  Negative for difficulty urinating, dysuria, frequency and urgency.   Musculoskeletal:  Negative for arthralgias and myalgias.   Skin:  Negative for color change and rash.   Neurological:  Negative for tremors, syncope and weakness.   Hematological:  Negative for adenopathy. Does not bruise/bleed easily.   Psychiatric/Behavioral:  Negative for behavioral problems and sleep disturbance.        Physical Findings   /55   Pulse 64   Temp 98.2 °F (36.8 °C) (Temporal)   Resp 16   SpO2 96%   Physical Exam  Vitals and nursing note reviewed.   Constitutional:       General: He is not in acute distress.     Appearance: He is well-developed.   HENT:      Head: Normocephalic and atraumatic.   Eyes:      General: No scleral icterus.     Pupils: Pupils are equal, round, and reactive to light.   Neck:      Vascular: No JVD.      Trachea: No tracheal deviation.   Cardiovascular:      Rate and Rhythm: Normal rate and regular rhythm.   Pulmonary:      Effort: Pulmonary effort is normal. No respiratory distress.      Breath sounds: No stridor.   Abdominal:      General: Bowel sounds are normal. There is no distension.      Palpations: Abdomen is soft. Abdomen is not rigid. There is no mass.      Tenderness: There is no abdominal tenderness. There is no guarding or rebound. Negative signs include Gallego's sign and McBurney's sign.      Hernia: No hernia is present.   Musculoskeletal:         General: Normal range of motion.      Cervical back: Normal range of motion and neck supple.   Skin:     General: Skin is warm and dry.   Neurological:      Mental Status: He is alert and oriented to person, place, and time.   Psychiatric:         Behavior: Behavior  normal.             Assessment   1. Right inguinal hernia          Plan     The patient will be scheduled for robotic repair of incarcerated right inguinal hernia with mesh.    The areli-operative care plan was discussed with the patient, who voices understanding.  Activity and lifting recommendations were discussed in length.     The risks, benefits, and alternatives to the procedure were explained to the patient.  The risks explained include, but are not limited to, bleeding, infection, pain wound complications, recurrence, incorrect diagnosis, injury to adjacent organs and structures. We also discussed the possibile need for further therapeutic, diagnostic, or surgical intervention.  The patient voiced understanding, and after all questions were answered to the patient's satisfaction, the patient provided willing and informed consent to proceed.     No orders of the defined types were placed in this encounter.      Imaging & Referrals   None    Follow Up  No follow-ups on file.    Abdoul Kumar MD      Date of Surgery:     DX:     ICD-10-CM   1. Right inguinal hernia  K40.90        Surgery Site :RIGHT / LEFT / BILATERAL: right    [] Open  [] Laparoscopic  [x] Robotic    [] Pilonidal Cystectomy  [] Excision of Lipomas     [] Sigmoidectomies    [] Hemorrhoidectomy   [] Transanal Hemorrhoidal Dearterialization [] Rectal Exam Under Anesthesia  [] Ureteral stent, Urologist  [x] Hernia   [] Ventral  [] Umbilical  [x] Inguinal  [] Incisional  [] Paraesophageal [] Component Separation (TAR)   [] Cholecystectomy  [] Appendectomy    [] Port placement      Anesthesia: ANESTHESIA TYPE: Gen    Location:  [] [place] [facility]   [] [facility] Out Pt Surgery  [] [facility]  [x] [place] [facility]   [] [facility]    Admission Status: EDW OR ADMIT LOCATION: [facility] Needed: Yes/No: Yes: Surgical PA      Clearance Needed:  []Medical Clearance   []Cardiac Clearance:   []Anticoagulation Management:    []Renal:   []Neuro:     Hx sleep apnea:           Pacemaker:        Latex allergies:      Pre-Admission Testing:  Surgeon's Personalized order Set.   Prophylactic Antibiotics Order: Prophylactic antibiotic protocol    Equipment:   [] C-Arm  Other:      Abdoul Kumar MD [facility]  Trauma Medical Director, [facility] General Surgery    The 21st Century Cures Act makes medical notes like these available to patients in the interest of transparency. Please be advised this is a medical document. Medical documents are intended to carry relevant information, facts as evident, and the clinical opinion of the practitioner. The medical note is intended as peer to peer communication and may appear blunt or direct. It is written in medical language and may contain abbreviations or verbiage that are unfamiliar.    This note was prepared using Dragon Medical voice recognition dictation software. As a result, errors may occur. When identified, these errors have been corrected. While every attempt is made to correct errors during dictation, discrepancies may still exist.             [1]   Past Medical History:   Allergic rhinitis    Atherosclerosis of coronary artery    BPH with elevated PSA    CAD S/P percutaneous coronary angioplasty    2007  4 blcokages 40%- no stents POBA    COPD (chronic obstructive pulmonary disease) (HCC)    Dizziness    Dysplastic nevi    High blood pressure    Hyperlipidemia    Seborrheic keratoses    Smoker    Supraventricular bigeminy    Wears glasses   [2]   Past Surgical History:  Procedure Laterality Date    Angioplasty (coronary)      Colonoscopy  2011    nl    Colonoscopy     [3]   Family History  Problem Relation Age of Onset    Cancer Mother         none    Heart Attack Father 50    Heart Disorder Brother         none    Breast Cancer Daughter    [4]   Social History  Socioeconomic History    Marital status:    Tobacco Use    Smoking status: Some Days     Current packs/day: 0.50     Average packs/day: 0.5 packs/day for 40.0  years (20.0 ttl pk-yrs)     Types: Cigarettes    Smokeless tobacco: Never    Tobacco comments:     7-10 cigarettesa daily   Vaping Use    Vaping status: Never Used   Substance and Sexual Activity    Alcohol use: Yes     Alcohol/week: 10.0 standard drinks of alcohol     Types: 10 Cans of beer per week     Comment: 1-2 beers daily    Drug use: No   Other Topics Concern    Caffeine Concern Yes     Comment: 2 cups of coffee daily    Exercise Yes     Comment: 4 x weekly    Seat Belt Yes    Special Diet No    Stress Concern No    Weight Concern No   [5]   Current Outpatient Medications:     PEG 3350-KCl-Na Bicarb-NaCl 420 g Oral Recon Soln, Take as directed by physician, Disp: 4000 mL, Rfl: 0    fluticasone propionate 50 MCG/ACT Nasal Suspension, 2 sprays by Nasal route daily., Disp: 48 mL, Rfl: 1    AMLODIPINE 5 MG Oral Tab, TAKE 1 TABLET (5 MG TOTAL) BY MOUTH DAILY., Disp: 90 tablet, Rfl: 1    meclizine 25 MG Oral Tab, Take 1 tablet (25 mg total) by mouth 3 (three) times daily as needed., Disp: 30 tablet, Rfl: 1    metoprolol succinate ER 25 MG Oral Tablet 24 Hr, Take 1 tablet (25 mg total) by mouth daily., Disp: , Rfl:     losartan 50 MG Oral Tab, Take 1 tablet (50 mg total) by mouth daily., Disp: , Rfl:     azelastine 137 MCG/SPRAY Nasal Solution, SPRAY 2 SPRAYS BY NASAL ROUTE DAILY AS NEEDED FOR RHINITIS., Disp: 30 mL, Rfl: 0    Methylsulfonylmethane (MSM) 1500 MG Oral Tab, Take 1 tablet by mouth every other day., Disp: , Rfl:     Ascorbic Acid (VITAMIN C OR), Take 1 capsule by mouth every other day., Disp: , Rfl:     Cholecalciferol (VITAMIN D) 1000 units Oral Tab, Take 1 tablet by mouth every other day., Disp: , Rfl:     Atorvastatin Calcium 20 MG Oral Tab, Take 1 tablet (20 mg total) by mouth nightly., Disp: , Rfl:     Aspirin (ASPIR-81 OR), Take 1 tablet by mouth daily.  , Disp: , Rfl:     Omega-3 Fatty Acids (FISH OIL) 1000 MG Oral Cap, Take 1,000 mg by mouth daily., Disp: , Rfl:

## 2025-06-27 ENCOUNTER — TELEPHONE (OUTPATIENT)
Facility: LOCATION | Age: 75
End: 2025-06-27

## 2025-06-27 DIAGNOSIS — K40.90 RIGHT INGUINAL HERNIA: Primary | ICD-10-CM

## 2025-08-18 PROBLEM — D12.4 BENIGN NEOPLASM OF DESCENDING COLON: Status: ACTIVE | Noted: 2025-08-18

## 2025-08-18 PROBLEM — D12.2 BENIGN NEOPLASM OF ASCENDING COLON: Status: ACTIVE | Noted: 2025-08-18

## 2025-08-18 PROBLEM — Z86.0101 PERSONAL HISTORY OF ADENOMATOUS AND SERRATED COLON POLYPS: Status: ACTIVE | Noted: 2025-08-18

## 2025-08-18 PROBLEM — D12.3 BENIGN NEOPLASM OF TRANSVERSE COLON: Status: ACTIVE | Noted: 2025-08-18

## 2025-08-28 ENCOUNTER — TELEPHONE (OUTPATIENT)
Dept: FAMILY MEDICINE CLINIC | Facility: CLINIC | Age: 75
End: 2025-08-28